# Patient Record
Sex: FEMALE | Race: BLACK OR AFRICAN AMERICAN | Employment: FULL TIME | URBAN - METROPOLITAN AREA
[De-identification: names, ages, dates, MRNs, and addresses within clinical notes are randomized per-mention and may not be internally consistent; named-entity substitution may affect disease eponyms.]

---

## 2021-06-08 ENCOUNTER — PATIENT MESSAGE (OUTPATIENT)
Dept: FAMILY MEDICINE CLINIC | Age: 36
End: 2021-06-08

## 2021-06-08 ENCOUNTER — OFFICE VISIT (OUTPATIENT)
Dept: FAMILY MEDICINE CLINIC | Age: 36
End: 2021-06-08
Payer: COMMERCIAL

## 2021-06-08 VITALS
BODY MASS INDEX: 31.65 KG/M2 | HEIGHT: 65 IN | DIASTOLIC BLOOD PRESSURE: 89 MMHG | OXYGEN SATURATION: 99 % | WEIGHT: 190 LBS | HEART RATE: 73 BPM | SYSTOLIC BLOOD PRESSURE: 120 MMHG | RESPIRATION RATE: 12 BRPM

## 2021-06-08 DIAGNOSIS — R22.1 MASS OF RIGHT SIDE OF NECK: Primary | ICD-10-CM

## 2021-06-08 PROCEDURE — 99203 OFFICE O/P NEW LOW 30 MIN: CPT | Performed by: FAMILY MEDICINE

## 2021-06-08 RX ORDER — NORGESTIMATE AND ETHINYL ESTRADIOL 0.25-0.035
KIT ORAL
COMMUNITY
Start: 2021-05-13 | End: 2022-08-02

## 2021-06-08 NOTE — ASSESSMENT & PLAN NOTE
Exam would not catch my attention outside of patient report of change from baseline. No dry mouth or parotid involvement to suggest Sjogren's. Not consistent with thyroglossal duct cyst. Further evaluation required to determine whether surgery or other intervention required. Currently benign process favored such delayed restoration to baseline size following lymphoid recruitment.

## 2021-06-08 NOTE — PROGRESS NOTES
Yadira Feliciano (: 1985) is a 39 y.o. female, new patient, here for:    ASSESSMENT/PLAN:  1. Mass of right side of neck  Assessment & Plan:  Exam would not catch my attention outside of patient report of change from baseline. No dry mouth or parotid involvement to suggest Sjogren's. Not consistent with thyroglossal duct cyst. Further evaluation required to determine whether surgery or other intervention required. Currently benign process favored such delayed restoration to baseline size following lymphoid recruitment. Orders:  -     US HEAD NECK SOFT TISSUE; Future      Return for neck ultrasound results via MyChart or by phone with plan to follow. SUBJECTIVE/OBJECTIVE:  HPI    Visible neck lesion x 6-8 months. Not painful. No associated symptoms. Stable since onset    No fever, unexplained weight loss  No dry mouth  No dental issues  No drainage or skin disruption    Fam hx largely unknown     been reminding her to follow through to have checked and finally complied      Physical Exam  Constitutional:       General: She is not in acute distress. Appearance: She is well-developed. She is not diaphoretic. HENT:      Head: Normocephalic and atraumatic. Nose: Nose normal.      Mouth/Throat:      Mouth: Mucous membranes are moist.      Dentition: Normal dentition. Tongue: No lesions. Pharynx: Oropharynx is clear. Eyes:      General: No scleral icterus. Conjunctiva/sclera: Conjunctivae normal.   Neck:     Cardiovascular:      Rate and Rhythm: Normal rate and regular rhythm. Heart sounds: Normal heart sounds. No murmur heard. No friction rub. No gallop. Pulmonary:      Effort: Pulmonary effort is normal. No respiratory distress. Breath sounds: Normal breath sounds. No wheezing, rhonchi or rales. Lymphadenopathy:      Head:      Right side of head: No tonsillar, posterior auricular or occipital adenopathy.       Left side of head: No tonsillar, posterior auricular or occipital adenopathy. Cervical:      Right cervical: No deep or posterior cervical adenopathy. Left cervical: No deep or posterior cervical adenopathy. Skin:     General: Skin is warm and dry. Nails: There is no clubbing. Neurological:      Mental Status: She is alert and oriented to person, place, and time. Psychiatric:         Behavior: Behavior normal.         Thought Content:  Thought content normal.         Judgment: Judgment normal.               -- Kita Gonsalves MD

## 2021-06-15 ENCOUNTER — HOSPITAL ENCOUNTER (OUTPATIENT)
Dept: ULTRASOUND IMAGING | Age: 36
Discharge: HOME | End: 2021-06-15
Attending: FAMILY MEDICINE
Payer: COMMERCIAL

## 2021-06-15 DIAGNOSIS — R22.1 MASS OF RIGHT SIDE OF NECK: ICD-10-CM

## 2021-06-15 PROCEDURE — 76536 US EXAM OF HEAD AND NECK: CPT

## 2021-06-16 NOTE — PROGRESS NOTES
Martel Sensing, please schedule a follow up visit with me to review the ultrasound results and our options. There is no need to panic! Your visit may be in the office, or it may be virtual, which ever you prefer.  Take good care, BOLIVAR

## 2021-06-22 ENCOUNTER — OFFICE VISIT (OUTPATIENT)
Dept: FAMILY MEDICINE CLINIC | Age: 36
End: 2021-06-22
Payer: COMMERCIAL

## 2021-06-22 VITALS
BODY MASS INDEX: 31.99 KG/M2 | OXYGEN SATURATION: 97 % | HEIGHT: 65 IN | WEIGHT: 192 LBS | RESPIRATION RATE: 12 BRPM | HEART RATE: 78 BPM | DIASTOLIC BLOOD PRESSURE: 73 MMHG | SYSTOLIC BLOOD PRESSURE: 122 MMHG

## 2021-06-22 DIAGNOSIS — K02.9 DENTAL CARIES: Primary | ICD-10-CM

## 2021-06-22 DIAGNOSIS — R59.0 SUBMENTAL LYMPHADENOPATHY: ICD-10-CM

## 2021-06-22 PROCEDURE — 99213 OFFICE O/P EST LOW 20 MIN: CPT | Performed by: FAMILY MEDICINE

## 2021-06-22 RX ORDER — AMOXICILLIN 500 MG/1
500 CAPSULE ORAL 3 TIMES DAILY
Qty: 30 CAPSULE | Refills: 0 | Status: SHIPPED | OUTPATIENT
Start: 2021-06-22 | End: 2021-07-02

## 2021-06-22 NOTE — PROGRESS NOTES
Pt in office for ultrasound review. 1. Have you been to the ER, urgent care clinic since your last visit? Hospitalized since your last visit? No    2. Have you seen or consulted any other health care providers outside of the [address] [place] [address] since your last visit? Include any pap smears or colon screening.  No

## 2021-06-22 NOTE — PROGRESS NOTES
Leora Sears (: 1985) is a 39 y.o. female, established patient, here for:    ASSESSMENT/PLAN:  1. Dental caries  -     amoxicillin (AMOXIL) 500 mg capsule; Take 1 Capsule by mouth three (3) times daily for 10 days. , Normal, Disp-30 Capsule, R-0  2. Submental lymphadenopathy  Assessment & Plan:  Low likelihood (8%) malignancy at this size reviewed. Confident due to dental caries. Trial amox. Improvement with antibiotics would be validating. If no change, highlight at next visit with dentist and return to me if told not consistent with cavity under consideration. Would then refer to ENT. Orders:  -     amoxicillin (AMOXIL) 500 mg capsule; Take 1 Capsule by mouth three (3) times daily for 10 days. , Normal, Disp-30 Capsule, R-0        No follow-ups on file. SUBJECTIVE/OBJECTIVE:  HPI    Dentist has been following a cavity x 18 months. Next surveillance in the fall. US Results (most recent):  Results from Orders Only encounter on 06/15/21    US HEAD NECK SOFT TISSUE    Narrative  Ultrasound head and neck soft tissue    HISTORY: Palpable areas of concern right neck; Cinda Rad Dx: Mass of right side of neck  [R22.1 (ICD-10-CM)]    COMPARISON: None. FINDINGS:    Targeted sonographic assessment performed of the right neck utilizing a linear 9  MHz transducer with representative static images obtained. The patient indicated the first area of palpable concern right submental region. In the area of palpable concern a 1.9 x 0.6 x 1.8 cm circumscribed oval  hypoechoic mass is noted without a fatty hilum and with some mild vascularity  likely an abnormal appearing lymph node. No abscess or skin thickening in the  region. The patient indicated a second area of palpable concern in the right  submandibular region and in the area a 1.1 x 0.4 x 1.1 cm circumscribed  hypoechoic mass is noted without a fatty hilum and with some vascular flow  likely a mildly abnormal lymph node as well.  No abscess or skin thickening in  the region area    Impression  In the 2 areas of clinical concern indicated by the patient, abnormal appearing  lymph nodes without fatty romeo are noted the larger in the submental region. These potentially could be reactive but clinical correlation is recommended. Further assessment of these lymph nodes should be based on clinical grounds. Physical Exam  Lymphadenopathy:      Upper Body:      Right upper body: No axillary adenopathy. Left upper body: No axillary adenopathy. Lower Body: No right inguinal adenopathy. No left inguinal adenopathy.                -- Juan Jose Ochoa MD

## 2021-06-22 NOTE — ASSESSMENT & PLAN NOTE
Low likelihood (8%) malignancy at this size reviewed. Confident due to dental caries. Trial amox. Improvement with antibiotics would be validating. If no change, highlight at next visit with dentist and return to me if told not consistent with cavity under consideration. Would then refer to ENT.

## 2021-10-18 ENCOUNTER — OFFICE VISIT (OUTPATIENT)
Dept: FAMILY MEDICINE CLINIC | Age: 36
End: 2021-10-18
Payer: COMMERCIAL

## 2021-10-18 VITALS
TEMPERATURE: 98.5 F | DIASTOLIC BLOOD PRESSURE: 66 MMHG | BODY MASS INDEX: 33.32 KG/M2 | RESPIRATION RATE: 10 BRPM | HEART RATE: 60 BPM | HEIGHT: 65 IN | OXYGEN SATURATION: 100 % | SYSTOLIC BLOOD PRESSURE: 114 MMHG | WEIGHT: 200 LBS

## 2021-10-18 DIAGNOSIS — L68.0 HIRSUTISM: ICD-10-CM

## 2021-10-18 DIAGNOSIS — K21.9 GASTROESOPHAGEAL REFLUX DISEASE, UNSPECIFIED WHETHER ESOPHAGITIS PRESENT: ICD-10-CM

## 2021-10-18 DIAGNOSIS — N92.1 MENOMETRORRHAGIA: ICD-10-CM

## 2021-10-18 DIAGNOSIS — R07.89 CHEST PAIN, NON-CARDIAC: Primary | ICD-10-CM

## 2021-10-18 DIAGNOSIS — R06.02 SHORTNESS OF BREATH: ICD-10-CM

## 2021-10-18 DIAGNOSIS — M25.50 POLYARTHRALGIA: ICD-10-CM

## 2021-10-18 DIAGNOSIS — D64.9 ANEMIA, UNSPECIFIED TYPE: ICD-10-CM

## 2021-10-18 DIAGNOSIS — R20.2 PARESTHESIA: ICD-10-CM

## 2021-10-18 DIAGNOSIS — E16.1 HYPERINSULINEMIA: ICD-10-CM

## 2021-10-18 PROCEDURE — 99215 OFFICE O/P EST HI 40 MIN: CPT | Performed by: FAMILY MEDICINE

## 2021-10-18 RX ORDER — PHENOL/SODIUM PHENOLATE
20 AEROSOL, SPRAY (ML) MUCOUS MEMBRANE DAILY
Qty: 30 TABLET | Refills: 2 | Status: SHIPPED | OUTPATIENT
Start: 2021-10-18 | End: 2021-11-16

## 2021-10-18 NOTE — PROGRESS NOTES
Patient being seen today to discuss multiple symptoms she says she has been having on and off for about 10 years now and no one can figure out what is causing them. She c/o neck and chest pain as well as hot flashes. She also c/o joint pains, arms going numb mostly the left arm she says she has tingling in her limbs and random dizzy spells. 1. \"Have you been to the ER, urgent care clinic since your last visit? Hospitalized since your last visit? \" No    2. \"Have you seen or consulted any other health care providers outside of the [address] [place] [address] since your last visit? \" No     3. For patients aged 39-70: Has the patient had a colonoscopy? No n/a  If the patient is female:    4. For patients aged 41-77: Has the patient had a mammogram within the past 2 years? No n/a    5. For patients aged 21-65: Has the patient had a pap smear? No Has [facility]
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
## 2021-10-21 ENCOUNTER — HOSPITAL ENCOUNTER (OUTPATIENT)
Dept: LAB | Age: 36
Discharge: HOME | End: 2021-10-21
Payer: COMMERCIAL

## 2021-10-21 DIAGNOSIS — D64.9 ANEMIA, UNSPECIFIED TYPE: ICD-10-CM

## 2021-10-21 DIAGNOSIS — R20.2 PARESTHESIA: ICD-10-CM

## 2021-10-21 DIAGNOSIS — E16.1 HYPERINSULINEMIA: ICD-10-CM

## 2021-10-21 DIAGNOSIS — M25.50 POLYARTHRALGIA: ICD-10-CM

## 2021-10-21 LAB
25(OH)D3 SERPL-MCNC: 30.8 NG/ML (ref 30–100)
ALBUMIN SERPL-MCNC: 3.5 G/DL (ref 3.4–5)
ALBUMIN/GLOB SERPL: 0.9 {RATIO} (ref 0.8–1.7)
ALP SERPL-CCNC: 36 U/L (ref 45–117)
ALT SERPL-CCNC: 17 U/L (ref 13–56)
ANION GAP SERPL CALC-SCNC: 3 MMOL/L (ref 3–18)
AST SERPL-CCNC: 14 U/L (ref 10–38)
BASOPHILS # BLD: 0 K/UL (ref 0–0.1)
BASOPHILS NFR BLD: 1 % (ref 0–2)
BILIRUB SERPL-MCNC: 0.3 MG/DL (ref 0.2–1)
BUN SERPL-MCNC: 12 MG/DL (ref 7–18)
BUN/CREAT SERPL: 14 (ref 12–20)
CALCIUM SERPL-MCNC: 8.9 MG/DL (ref 8.5–10.1)
CHLORIDE SERPL-SCNC: 107 MMOL/L (ref 100–111)
CO2 SERPL-SCNC: 28 MMOL/L (ref 21–32)
CREAT SERPL-MCNC: 0.86 MG/DL (ref 0.6–1.3)
DIFFERENTIAL METHOD BLD: ABNORMAL
EOSINOPHIL # BLD: 0.1 K/UL (ref 0–0.4)
EOSINOPHIL NFR BLD: 1 % (ref 0–5)
ERYTHROCYTE [DISTWIDTH] IN BLOOD BY AUTOMATED COUNT: 12.7 % (ref 11.6–14.5)
ERYTHROCYTE [SEDIMENTATION RATE] IN BLOOD: 29 MM/HR (ref 0–20)
EST. AVERAGE GLUCOSE BLD GHB EST-MCNC: 120 MG/DL
FERRITIN SERPL-MCNC: 36 NG/ML (ref 8–388)
FOLATE SERPL-MCNC: 13 NG/ML (ref 3.1–17.5)
GLOBULIN SER CALC-MCNC: 4.1 G/DL (ref 2–4)
GLUCOSE SERPL-MCNC: 96 MG/DL (ref 74–99)
HBA1C MFR BLD: 5.8 % (ref 4.2–5.6)
HCT VFR BLD AUTO: 35.2 % (ref 35–45)
HGB BLD-MCNC: 11.4 G/DL (ref 12–16)
IRON SATN MFR SERPL: 22 % (ref 20–50)
IRON SERPL-MCNC: 93 UG/DL (ref 50–175)
LYMPHOCYTES # BLD: 2 K/UL (ref 0.9–3.6)
LYMPHOCYTES NFR BLD: 31 % (ref 21–52)
MCH RBC QN AUTO: 28.6 PG (ref 24–34)
MCHC RBC AUTO-ENTMCNC: 32.4 G/DL (ref 31–37)
MCV RBC AUTO: 88.4 FL (ref 78–100)
MONOCYTES # BLD: 0.4 K/UL (ref 0.05–1.2)
MONOCYTES NFR BLD: 6 % (ref 3–10)
NEUTS SEG # BLD: 4 K/UL (ref 1.8–8)
NEUTS SEG NFR BLD: 61 % (ref 40–73)
PLATELET # BLD AUTO: 348 K/UL (ref 135–420)
PMV BLD AUTO: 9.3 FL (ref 9.2–11.8)
POTASSIUM SERPL-SCNC: 4.1 MMOL/L (ref 3.5–5.5)
PROT SERPL-MCNC: 7.6 G/DL (ref 6.4–8.2)
RBC # BLD AUTO: 3.98 M/UL (ref 4.2–5.3)
SODIUM SERPL-SCNC: 138 MMOL/L (ref 136–145)
TIBC SERPL-MCNC: 422 UG/DL (ref 250–450)
TSH SERPL-ACNC: 1.78 UIU/ML (ref 0.36–3.74)
VIT B12 SERPL-MCNC: 239 PG/ML (ref 211–911)
WBC # BLD AUTO: 6.5 K/UL (ref 4.6–13.2)

## 2021-10-21 PROCEDURE — 80053 COMPREHEN METABOLIC PANEL: CPT

## 2021-10-21 PROCEDURE — 82306 VITAMIN D 25 HYDROXY: CPT

## 2021-10-21 PROCEDURE — 83540 ASSAY OF IRON: CPT

## 2021-10-21 PROCEDURE — 83525 ASSAY OF INSULIN: CPT

## 2021-10-21 PROCEDURE — 82157 ASSAY OF ANDROSTENEDIONE: CPT

## 2021-10-21 PROCEDURE — 82607 VITAMIN B-12: CPT

## 2021-10-21 PROCEDURE — 85025 COMPLETE CBC W/AUTO DIFF WBC: CPT

## 2021-10-21 PROCEDURE — 83036 HEMOGLOBIN GLYCOSYLATED A1C: CPT

## 2021-10-21 PROCEDURE — 85652 RBC SED RATE AUTOMATED: CPT

## 2021-10-21 PROCEDURE — 83498 ASY HYDROXYPROGESTERONE 17-D: CPT

## 2021-10-21 PROCEDURE — 84146 ASSAY OF PROLACTIN: CPT

## 2021-10-21 PROCEDURE — 36415 COLL VENOUS BLD VENIPUNCTURE: CPT

## 2021-10-21 PROCEDURE — 86141 C-REACTIVE PROTEIN HS: CPT

## 2021-10-21 PROCEDURE — 82728 ASSAY OF FERRITIN: CPT

## 2021-10-21 PROCEDURE — 84443 ASSAY THYROID STIM HORMONE: CPT

## 2021-10-21 PROCEDURE — 82627 DEHYDROEPIANDROSTERONE: CPT

## 2021-10-22 LAB
CRP SERPL HS-MCNC: 7.1 MG/L
DHEA-S SERPL-MCNC: 190 UG/DL (ref 57.3–279.2)
INSULIN SERPL-ACNC: 16.8 UIU/ML (ref 2.6–24.9)
PROLACTIN SERPL-MCNC: 3.4 NG/ML

## 2021-10-27 LAB — ANDROST SERPL-MCNC: 69 NG/DL (ref 41–262)

## 2021-11-16 ENCOUNTER — OFFICE VISIT (OUTPATIENT)
Dept: FAMILY MEDICINE CLINIC | Age: 36
End: 2021-11-16
Payer: COMMERCIAL

## 2021-11-16 VITALS
SYSTOLIC BLOOD PRESSURE: 110 MMHG | OXYGEN SATURATION: 98 % | HEART RATE: 61 BPM | DIASTOLIC BLOOD PRESSURE: 78 MMHG | RESPIRATION RATE: 10 BRPM | HEIGHT: 65 IN | TEMPERATURE: 97.6 F | WEIGHT: 204.8 LBS | BODY MASS INDEX: 34.12 KG/M2

## 2021-11-16 DIAGNOSIS — R73.03 PREDIABETES: ICD-10-CM

## 2021-11-16 DIAGNOSIS — R07.89 CHEST PAIN, NON-CARDIAC: ICD-10-CM

## 2021-11-16 DIAGNOSIS — E28.2 PCOS (POLYCYSTIC OVARIAN SYNDROME): Primary | ICD-10-CM

## 2021-11-16 DIAGNOSIS — M25.50 POLYARTHRALGIA: ICD-10-CM

## 2021-11-16 DIAGNOSIS — R06.02 SHORTNESS OF BREATH: ICD-10-CM

## 2021-11-16 PROCEDURE — 99214 OFFICE O/P EST MOD 30 MIN: CPT | Performed by: FAMILY MEDICINE

## 2021-11-16 RX ORDER — METFORMIN HYDROCHLORIDE 500 MG/1
1000 TABLET, EXTENDED RELEASE ORAL
Qty: 180 TABLET | Refills: 1 | Status: SHIPPED | OUTPATIENT
Start: 2021-11-16 | End: 2022-03-29

## 2021-11-16 NOTE — ASSESSMENT & PLAN NOTE
menometrorrhagia, hirsutism, preDM with otherwise normal labs. Initiating metformin 1000 mg daily.  follow up 3-6 months

## 2021-11-16 NOTE — PROGRESS NOTES
Patient here for f/u on her lab results and to discuss some symptoms she has been having. 1. \"Have you been to the ER, urgent care clinic since your last visit? Hospitalized since your last visit? \" No    2. \"Have you seen or consulted any other health care providers outside of the [address] [place] [address] since your last visit? \" No     3. For patients aged 39-70: Has the patient had a colonoscopy? No n/a    If the patient is female:    4. For patients aged 41-77: Has the patient had a mammogram within the past 2 years? No n/a    5. For patients aged 21-65: Has the patient had a pap smear? Yes, but HM not satisfied.  Rooming MA/LPN to request most recent results

## 2021-11-16 NOTE — PROGRESS NOTES
Heike Ceballos (: 1985) is a 39 y.o. female, established patient, here for:    ASSESSMENT/PLAN:  1. PCOS (polycystic ovarian syndrome)  Assessment & Plan:  menometrorrhagia, hirsutism, preDM with otherwise normal labs. Initiating metformin 1000 mg daily. follow up 3-6 months   Orders:  -     metFORMIN ER (GLUCOPHAGE XR) 500 mg tablet; Take 2 Tablets by mouth daily (with dinner). Start with 1 tab daily. Increase as tolerated., Normal, Disp-180 Tablet, R-1  2. Shortness of breath  Assessment & Plan:  No clinical improvement with PPI. Discontinuing. PFTs. Declined empiric trial albuterol, which is reasonable. Orders:  -     PULMONARY FUNCTION TEST; Future  3. Chest pain, non-cardiac  -     PULMONARY FUNCTION TEST; Future  4. Polyarthralgia  Assessment & Plan:  Declining reassessment of clinical symptomatology after addressing PCOS and suspected mild asthma. I suspect her low-normal iron may be truly low for her and contributor to her symptomatology. She is not currently interested in iron replacement. ESR 29 is not concerning, but with CRP 7, rheum consultation is reasonable. Orders:  -     REFERRAL TO RHEUMATOLOGY  5. Prediabetes  Assessment & Plan:  New. Metformin for comorbid PCOS. Return in about 6 weeks (around 2021) for PFT follow up, (30).       SUBJECTIVE/OBJECTIVE:  HPI    follow up non cardiac chest pain, non exertional shortness of breath  - empiric trial PPI    follow up menometrorrhagia (controlled with COCs) and hirsutism, prior report of hyperinsulinemia suggestive of PCOS    Particularly concerned about lab implications of her polyarthralgias, which persist, with morning focus  Results for orders placed or performed during the hospital encounter of 10/21/21   INSULIN   Result Value Ref Range    Insulin 16.8 2.6 - 64.8 uIU/mL   METABOLIC PANEL, COMPREHENSIVE   Result Value Ref Range    Sodium 138 136 - 145 mmol/L    Potassium 4.1 3.5 - 5.5 mmol/L    Chloride 107 100 - 111 mmol/L CO2 28 21 - 32 mmol/L    Anion gap 3 3.0 - 18 mmol/L    Glucose 96 74 - 99 mg/dL    BUN 12 7.0 - 18 MG/DL    Creatinine 0.86 0.6 - 1.3 MG/DL    BUN/Creatinine ratio 14 12 - 20      GFR est AA >60 >60 ml/min/1.73m2    GFR est non-AA >60 >60 ml/min/1.73m2    Calcium 8.9 8.5 - 10.1 MG/DL    Bilirubin, total 0.3 0.2 - 1.0 MG/DL    ALT (SGPT) 17 13 - 56 U/L    AST (SGOT) 14 10 - 38 U/L    Alk. phosphatase 36 (L) 45 - 117 U/L    Protein, total 7.6 6.4 - 8.2 g/dL    Albumin 3.5 3.4 - 5.0 g/dL    Globulin 4.1 (H) 2.0 - 4.0 g/dL    A-G Ratio 0.9 0.8 - 1.7     ANDROSTENEDIONE   Result Value Ref Range    Androstenedione 69 41 - 262 ng/dL   PROLACTIN   Result Value Ref Range    Prolactin 3.4 ng/mL   DHEA SULFATE   Result Value Ref Range    DHEA Sulfate 190.0 57.3 - 279.2 ug/dL   TSH W/ REFLX FREE T4 IF ABNORMAL   Result Value Ref Range    TSH 1.78 0.36 - 3.74 uIU/mL   CRP, HIGH SENSITIVITY   Result Value Ref Range    CRP, High sensitivity 7.1 mg/L   SED RATE (ESR)   Result Value Ref Range    Sed rate, automated 29 (H) 0 - 20 mm/hr   CBC WITH AUTOMATED DIFF   Result Value Ref Range    WBC 6.5 4.6 - 13.2 K/uL    RBC 3.98 (L) 4.20 - 5.30 M/uL    HGB 11.4 (L) 12.0 - 16.0 g/dL    HCT 35.2 35.0 - 45.0 %    MCV 88.4 78.0 - 100.0 FL    MCH 28.6 24.0 - 34.0 PG    MCHC 32.4 31.0 - 37.0 g/dL    RDW 12.7 11.6 - 14.5 %    PLATELET 779 551 - 638 K/uL    MPV 9.3 9.2 - 11.8 FL    NEUTROPHILS 61 40 - 73 %    LYMPHOCYTES 31 21 - 52 %    MONOCYTES 6 3 - 10 %    EOSINOPHILS 1 0 - 5 %    BASOPHILS 1 0 - 2 %    ABS. NEUTROPHILS 4.0 1.8 - 8.0 K/UL    ABS. LYMPHOCYTES 2.0 0.9 - 3.6 K/UL    ABS. MONOCYTES 0.4 0.05 - 1.2 K/UL    ABS. EOSINOPHILS 0.1 0.0 - 0.4 K/UL    ABS.  BASOPHILS 0.0 0.0 - 0.1 K/UL    DF AUTOMATED     FERRITIN   Result Value Ref Range    Ferritin 36 8 - 388 NG/ML   IRON PROFILE   Result Value Ref Range    Iron 93 50 - 175 ug/dL    TIBC 422 250 - 450 ug/dL    Iron % saturation 22 20 - 50 %   VITAMIN D, 25 HYDROXY   Result Value Ref Range    Vitamin D 25-Hydroxy 30.8 30 - 100 ng/mL   VITAMIN B12 & FOLATE   Result Value Ref Range    Vitamin B12 239 211 - 911 pg/mL    Folate 13.0 3.10 - 17.50 ng/mL   HEMOGLOBIN A1C WITH EAG   Result Value Ref Range    Hemoglobin A1c 5.8 (H) 4.2 - 5.6 %    Est. average glucose 120 mg/dL     Prior to Admission medications    Medication Sig Start Date End Date Taking? Authorizing Provider   Omeprazole delayed release (PRILOSEC D/R) 20 mg tablet Take 1 Tablet by mouth daily.  10/18/21  Yes Mary Ann Leung MD   Mono-Linyah 0.25-35 mg-mcg tab take 1 tablet by mouth once daily 5/13/21  Yes Provider, Historical       Physical Exam          -- Isrrael Overton MD

## 2021-11-16 NOTE — ASSESSMENT & PLAN NOTE
No clinical improvement with PPI. Discontinuing. PFTs. Declined empiric trial albuterol, which is reasonable.

## 2021-11-16 NOTE — ASSESSMENT & PLAN NOTE
Declining reassessment of clinical symptomatology after addressing PCOS and suspected mild asthma. I suspect her low-normal iron may be truly low for her and contributor to her symptomatology. She is not currently interested in iron replacement. ESR 29 is not concerning, but with CRP 7, rheum consultation is reasonable.

## 2021-11-16 NOTE — PATIENT INSTRUCTIONS
Learning About Metformin for Type 2 Diabetes  Introduction     Metformin is a medicine used to treat type 2 diabetes. It helps keep blood sugar levels on target. You may have tried to eat a healthy diet, lose weight, and get more exercise to keep your blood sugar in your target range. If those things do not help, you may take a medicine called metformin. It helps your body use insulin. This can help you control your blood sugar. You might take it on its own or with other medicines. When taken on its own, metformin should not cause low blood sugar or weight gain. Example  · Metformin (Glucophage)  Possible side effects  Common side effects include:  · Short-term nausea. · Not feeling hungry. · Diarrhea. · Increased gas in your belly. · A metallic taste. You may have side effects or reactions not listed here. Check the information that comes with your medicine. What to know about taking this medicine  · Metformin does not usually cause low blood sugar. But you may get a low blood sugar when you take metformin and you exercise hard, drink alcohol, or you do not eat enough food. · Sometimes metformin is combined with other diabetes medicine. Some of these can cause low blood sugar. · If you need a test that uses a dye or you need to have surgery, be sure to tell all of your doctors that you take metformin. You may have to stop taking it before and after the test or surgery. · Over time, blood levels of vitamin B12 can decrease in some people who take metformin. Your body needs this B vitamin to make blood cells. It also keeps your nervous system healthy. If you have been taking metformin for more than a few years, ask your doctor if you need a B12 blood test to measure the amount of vitamin B12 in your blood. · Be safe with medicines. Take your medicines exactly as prescribed. Call your doctor if you think you are having a problem with your medicine.   · Check with your doctor or pharmacist before you use any other medicines. This includes over-the-counter medicines. Make sure your doctor knows all of the medicines, vitamins, herbal products, and supplements you take. Taking some medicines together can cause problems. Where can you learn more? Go to http://www.[employer].com/  Enter J360 in the search box to learn more about \"Learning About Metformin for Type 2 Diabetes. \"  Current as of: August 31, 2020               Content Version: 13.0  © 2006-2021 [employer]. Care instructions adapted under license by [employer] (which disclaims liability or warranty for this information). If you have questions about a medical condition or this instruction, always ask your healthcare professional. [address] any warranty or liability for your use of this information. Polycystic Ovary Syndrome: Care Instructions  Overview  Polycystic ovary syndrome (PCOS) is a hormone imbalance that can affect ovulation. It can cause problems with your periods and make it hard to get pregnant. Doctors don't know for sure what causes PCOS, but it seems to run in families. It also seems to be linked to obesity and a risk for diabetes. You may have other symptoms. These include weight gain, acne, hair growth on the face or body, high blood pressure, and high blood sugar. Your ovaries may have cysts on them. These cysts are growths filled with fluid. Keep in mind that even though you may not have regular periods, you can still get pregnant. Talk to your doctor about birth control if you don't want to get pregnant. Sometimes the hormone changes with PCOS can also make it hard to get pregnant. If this is a concern, talk to your doctor about treatment for this problem. With PCOS, you may go for months or longer with no period. Your doctor may recommend medicines that can help regulate your cycle. Follow-up care is a key part of your treatment and safety.  Be sure to make and go to all appointments, and call your doctor if you are having problems. It's also a good idea to know your test results and keep a list of the medicines you take. How can you care for yourself at home? · Take your medicines exactly as prescribed. Call your doctor if you think you're having a problem with your medicine. · Eat a healthy diet. Include vegetables, fruits, beans, and whole grains in your diet each day. · If you're overweight, talk to your doctor about safe ways to lose weight. Losing weight can help with many of the symptoms of PCOS. · Get at least 30 minutes of exercise on most days of the week. Walking is a good choice. Or you can run, swim, cycle, or play team sports. · If you have symptoms that bother you, such as acne and excess hair growth, talk to your doctor about treatment options. Medicines can help. For unwanted hair growth, some prefer to use home treatments. These can include shaving, waxing, or other methods to remove the hair. · If you're feeling sad or depressed, consider talking to a counselor or to others who have PCOS. It may help. When should you call for help? Call your doctor now or seek immediate medical care if:    · You have severe vaginal bleeding.     · You have new or worse belly or pelvic pain. Watch closely for changes in your health, and be sure to contact your doctor if:    · You do not get better as expected.     · You have unusual vaginal bleeding. Where can you learn more? Go to http://owen-ern[employer].info/  Enter K559 in the search box to learn more about \"Polycystic Ovary Syndrome: Care Instructions. \"  Current as of: February 11, 2021               Content Version: 13.0  © 2410-0790 [employer]. Care instructions adapted under license by [employer] (which disclaims liability or warranty for this information).  If you have questions about a medical condition or this instruction, always ask your healthcare professional. [address] any warranty or liability for your use of this information.

## 2021-11-17 DIAGNOSIS — R07.89 CHEST PAIN, NON-CARDIAC: ICD-10-CM

## 2021-11-17 DIAGNOSIS — R06.02 SHORTNESS OF BREATH: ICD-10-CM

## 2021-12-06 ENCOUNTER — HOSPITAL ENCOUNTER (OUTPATIENT)
Dept: PULMONOLOGY | Age: 36
Discharge: HOME | End: 2021-12-06
Payer: COMMERCIAL

## 2021-12-06 VITALS — HEART RATE: 62 BPM | OXYGEN SATURATION: 96 %

## 2021-12-06 PROCEDURE — 94640 AIRWAY INHALATION TREATMENT: CPT

## 2021-12-06 PROCEDURE — 94726 PLETHYSMOGRAPHY LUNG VOLUMES: CPT

## 2021-12-06 PROCEDURE — 94729 DIFFUSING CAPACITY: CPT

## 2021-12-06 PROCEDURE — 94060 EVALUATION OF WHEEZING: CPT

## 2021-12-06 RX ORDER — ALBUTEROL SULFATE 0.83 MG/ML
2.5 SOLUTION RESPIRATORY (INHALATION)
Status: COMPLETED | OUTPATIENT
Start: 2021-12-06 | End: 2021-12-06

## 2021-12-06 RX ADMIN — ALBUTEROL SULFATE 2.5 MG: 0.83 SOLUTION RESPIRATORY (INHALATION) at 12:00

## 2021-12-06 NOTE — PROGRESS NOTES
Patient did great. She tolerated the HHN and procedure well. No adverse reactions noted. The order had to be reentered because it was not activated for me to complete.

## 2021-12-07 ENCOUNTER — TELEPHONE (OUTPATIENT)
Dept: FAMILY MEDICINE CLINIC | Age: 36
End: 2021-12-07

## 2021-12-07 NOTE — TELEPHONE ENCOUNTER
Order has been printed and faxed, called Kieran Carey to let her know she should be receiving order within a few minutes.

## 2021-12-07 NOTE — TELEPHONE ENCOUNTER
----- Message from Vivi Jimenez sent at 12/6/2021 12:50 PM EST -----  Subject: Message to Provider    QUESTIONS  Information for Provider? David from [facility] calling,   no order for the PFT and she already processed it and wanted to see if we   could fax a order to her today.  #[phone] fax# [phone]  ---------------------------------------------------------------------------  --------------  Anna Carrier INFO  What is the best way for the office to contact you? OK to leave message on   voicemail  Preferred Call Back Phone Number? [phone]  ---------------------------------------------------------------------------  --------------  SCRIPT ANSWERS  Relationship to Patient?  Third Party  Representative Name? pft

## 2021-12-28 ENCOUNTER — OFFICE VISIT (OUTPATIENT)
Dept: FAMILY MEDICINE CLINIC | Age: 36
End: 2021-12-28
Payer: COMMERCIAL

## 2021-12-28 VITALS
HEART RATE: 60 BPM | TEMPERATURE: 98.2 F | SYSTOLIC BLOOD PRESSURE: 128 MMHG | WEIGHT: 207 LBS | OXYGEN SATURATION: 99 % | BODY MASS INDEX: 34.49 KG/M2 | HEIGHT: 65 IN | RESPIRATION RATE: 12 BRPM | DIASTOLIC BLOOD PRESSURE: 78 MMHG

## 2021-12-28 DIAGNOSIS — R06.02 SHORTNESS OF BREATH: Primary | ICD-10-CM

## 2021-12-28 DIAGNOSIS — E28.2 PCOS (POLYCYSTIC OVARIAN SYNDROME): ICD-10-CM

## 2021-12-28 PROCEDURE — 99213 OFFICE O/P EST LOW 20 MIN: CPT | Performed by: FAMILY MEDICINE

## 2021-12-28 NOTE — PROGRESS NOTES
Patient being seen today for f/u on her PFT results. 1. \"Have you been to the ER, urgent care clinic since your last visit? Hospitalized since your last visit? \" No    2. \"Have you seen or consulted any other health care providers outside of the [address] [place] [address] since your last visit? \" No     3. For patients aged 39-70: Has the patient had a colonoscopy / FIT/ Cologuard? NA based on age or sex     If the patient is female:    3. For patients aged 41-77: Has the patient had a mammogram within the past 2 years? NA based on age or sex    11. For patients aged 21-65: Has the patient had a pap smear?  No

## 2021-12-28 NOTE — ASSESSMENT & PLAN NOTE
Episodic nature occurring at rest argues against cardiomyopathy or CAD. normal PFTs eliminates chronic lung disease. Could still be bronchospastic, but short duration makes less likely. Previously normal Holter study may not have captured problem. For now we agreed to focus on other conditions being treated and symptom diary. Will follow up 3-6 months, sooner if new/different symptoms.

## 2021-12-28 NOTE — PROGRESS NOTES
Sarai Lucas (: 1985) is a 39 y.o. female, established patient, here for:    ASSESSMENT/PLAN:  1. Shortness of breath  Assessment & Plan:  Episodic nature occurring at rest argues against cardiomyopathy or CAD. normal PFTs eliminates chronic lung disease. Could still be bronchospastic, but short duration makes less likely. Previously normal Holter study may not have captured problem. For now we agreed to focus on other conditions being treated and symptom diary. Will follow up 3-6 months, sooner if new/different symptoms. 2. PCOS (polycystic ovarian syndrome)  Assessment & Plan: Tolerating metformin. follow up 3-6 months to assess response       Return for PCOS follow up in 3-6 months, no labs prior, (30). SUBJECTIVE/OBJECTIVE:  HPI    follow up PFTs for shortness of breath - normal     shortness of breath tends to happen in \"spurts\" then can go weeks with no problems. Each time lasts 20 seconds to perhaps 3 minutes. Always happens when not physically active  Reports normal Holter monitor x 48 days - event happened after she turned it in    No particular time of day  Hasn't looked for seasonal or other patterns    follow up PCOS - initial nausea with metformin. Tolerating 1000 mg daily without side effects x 2 weeks      Prior to Admission medications    Medication Sig Start Date End Date Taking? Authorizing Provider   metFORMIN ER (GLUCOPHAGE XR) 500 mg tablet Take 2 Tablets by mouth daily (with dinner). Start with 1 tab daily. Increase as tolerated. 21  Yes Olivia Gagnon MD   Mono-Linyah 0.25-35 mg-mcg tab take 1 tablet by mouth once daily 21  Yes Provider, Historical       Physical Exam  Constitutional:       General: She is not in acute distress. Appearance: She is well-developed. HENT:      Head: Normocephalic and atraumatic. Pulmonary:      Effort: Pulmonary effort is normal.   Neurological:      Mental Status: She is alert and oriented to person, place, and time. Psychiatric:         Behavior: Behavior normal.         Thought Content:  Thought content normal.         Judgment: Judgment normal.               -- Calderon Lind MD

## 2022-03-04 ENCOUNTER — TELEPHONE (OUTPATIENT)
Dept: FAMILY MEDICINE CLINIC | Age: 37
End: 2022-03-04

## 2022-03-04 NOTE — TELEPHONE ENCOUNTER
Received fax from Dr. Tom Signs office stating patients Vit B12 is low and to f/u with her PCP. Patient has been scheduled on 3/29/22. Will request lab results.

## 2022-03-18 PROBLEM — R06.02 SHORTNESS OF BREATH: Status: ACTIVE | Noted: 2021-11-16

## 2022-03-18 PROBLEM — M25.50 POLYARTHRALGIA: Status: ACTIVE | Noted: 2021-11-16

## 2022-03-18 PROBLEM — E28.2 PCOS (POLYCYSTIC OVARIAN SYNDROME): Status: ACTIVE | Noted: 2021-11-16

## 2022-03-19 PROBLEM — R59.0 SUBMENTAL LYMPHADENOPATHY: Status: ACTIVE | Noted: 2021-06-08

## 2022-03-19 PROBLEM — R73.03 PREDIABETES: Status: ACTIVE | Noted: 2021-11-16

## 2022-03-29 ENCOUNTER — OFFICE VISIT (OUTPATIENT)
Dept: FAMILY MEDICINE CLINIC | Age: 37
End: 2022-03-29
Payer: COMMERCIAL

## 2022-03-29 VITALS
SYSTOLIC BLOOD PRESSURE: 110 MMHG | RESPIRATION RATE: 10 BRPM | BODY MASS INDEX: 33.66 KG/M2 | HEIGHT: 65 IN | OXYGEN SATURATION: 99 % | DIASTOLIC BLOOD PRESSURE: 68 MMHG | WEIGHT: 202 LBS | HEART RATE: 63 BPM | TEMPERATURE: 99.5 F

## 2022-03-29 DIAGNOSIS — E53.8 VITAMIN B12 DEFICIENCY: Primary | ICD-10-CM

## 2022-03-29 DIAGNOSIS — E28.2 PCOS (POLYCYSTIC OVARIAN SYNDROME): ICD-10-CM

## 2022-03-29 PROCEDURE — 99214 OFFICE O/P EST MOD 30 MIN: CPT | Performed by: FAMILY MEDICINE

## 2022-03-29 RX ORDER — LANOLIN ALCOHOL/MO/W.PET/CERES
1000 CREAM (GRAM) TOPICAL DAILY
Qty: 90 TABLET | Refills: 3 | Status: SHIPPED | OUTPATIENT
Start: 2022-03-29

## 2022-03-29 RX ORDER — METFORMIN HYDROCHLORIDE 500 MG/1
1500 TABLET, EXTENDED RELEASE ORAL
Qty: 270 TABLET | Refills: 3 | Status: SHIPPED | OUTPATIENT
Start: 2022-03-29

## 2022-03-29 NOTE — PATIENT INSTRUCTIONS
Vitamin B12: About This Test  What is it? This blood test measures the amount of vitamin B12 in your blood. Your body needs this B vitamin to make blood cells and to keep your nervous system healthy. Why is this test done? This test is used to:  · Check for vitamin B12 deficiency anemia. The test is often done for those who've had stomach or bowel surgery. It's also done for those who have problems of the small intestine. And people may have the test if they have a family history of this anemia. · Check on the B12 level for someone who is on a diet that restricts certain foods (such as vegan). · Find the cause of other types of anemia. This includes megaloblastic anemia. Folate is usually measured at the same time. Lack of either vitamin can lead to this type of anemia. · Help find the cause of a decrease in mental abilities. It also can help find the cause of nervous system symptoms. These can include tingling or numbness of the arms or legs. · See if a person has vitamin B12 deficiency anemia after being diagnosed with atrophic gastritis. How do you prepare for the test?  You may need to fast for 8 hours before the test. Your doctor may give you some specific instructions. How is the test done? A health professional uses a needle to take a blood sample, usually from the arm. How long does the test take? The test will take a few minutes. What happens after the test?  · You will probably be able to go home right away. · You can go back to your usual activities right away. Follow-up care is a key part of your treatment and safety. Be sure to make and go to all appointments, and call your doctor if you are having problems. It's also a good idea to keep a list of the medicines you take. Ask your doctor when you can expect to have your test results. Where can you learn more?   Go to http://www.gray.com/  Enter Q213 in the search box to learn more about \"Vitamin B12: About This Test.\"  Current as of: November 29, 2021               Content Version: 13.2  © 3272-9199 [employer]. Care instructions adapted under license by [employer] (which disclaims liability or warranty for this information). If you have questions about a medical condition or this instruction, always ask your healthcare professional. [place] [address] any warranty or liability for your use of this information.

## 2022-07-27 ENCOUNTER — CLINICAL SUPPORT (OUTPATIENT)
Dept: FAMILY MEDICINE CLINIC | Age: 37
End: 2022-07-27
Payer: COMMERCIAL

## 2022-07-27 ENCOUNTER — HOSPITAL ENCOUNTER (OUTPATIENT)
Dept: LAB | Age: 37
Discharge: HOME | End: 2022-07-27
Payer: COMMERCIAL

## 2022-07-27 DIAGNOSIS — E28.2 PCOS (POLYCYSTIC OVARIAN SYNDROME): ICD-10-CM

## 2022-07-27 DIAGNOSIS — E53.8 VITAMIN B12 DEFICIENCY: Primary | ICD-10-CM

## 2022-07-27 DIAGNOSIS — E53.8 VITAMIN B12 DEFICIENCY: ICD-10-CM

## 2022-07-27 LAB
ALBUMIN SERPL-MCNC: 3.8 G/DL (ref 3.4–5)
ALBUMIN/GLOB SERPL: 1 {RATIO} (ref 0.8–1.7)
ALP SERPL-CCNC: 47 U/L (ref 45–117)
ALT SERPL-CCNC: 22 U/L (ref 13–56)
ANION GAP SERPL CALC-SCNC: 7 MMOL/L (ref 3–18)
AST SERPL-CCNC: 18 U/L (ref 10–38)
BASOPHILS # BLD: 0.1 K/UL (ref 0–0.1)
BASOPHILS NFR BLD: 1 % (ref 0–2)
BILIRUB SERPL-MCNC: 0.5 MG/DL (ref 0.2–1)
BUN SERPL-MCNC: 13 MG/DL (ref 7–18)
BUN/CREAT SERPL: 13 (ref 12–20)
CALCIUM SERPL-MCNC: 9.5 MG/DL (ref 8.5–10.1)
CHLORIDE SERPL-SCNC: 107 MMOL/L (ref 100–111)
CO2 SERPL-SCNC: 26 MMOL/L (ref 21–32)
CREAT SERPL-MCNC: 0.97 MG/DL (ref 0.6–1.3)
DIFFERENTIAL METHOD BLD: ABNORMAL
EOSINOPHIL # BLD: 0.2 K/UL (ref 0–0.4)
EOSINOPHIL NFR BLD: 2 % (ref 0–5)
ERYTHROCYTE [DISTWIDTH] IN BLOOD BY AUTOMATED COUNT: 14.1 % (ref 11.6–14.5)
EST. AVERAGE GLUCOSE BLD GHB EST-MCNC: 114 MG/DL
FOLATE SERPL-MCNC: 17.6 NG/ML (ref 3.1–17.5)
GLOBULIN SER CALC-MCNC: 3.9 G/DL (ref 2–4)
GLUCOSE SERPL-MCNC: 93 MG/DL (ref 74–99)
HBA1C MFR BLD: 5.6 % (ref 4.2–5.6)
HCT VFR BLD AUTO: 40.4 % (ref 35–45)
HGB BLD-MCNC: 12.1 G/DL (ref 12–16)
IMM GRANULOCYTES # BLD AUTO: 0 K/UL (ref 0–0.04)
IMM GRANULOCYTES NFR BLD AUTO: 0 % (ref 0–0.5)
LYMPHOCYTES # BLD: 2.7 K/UL (ref 0.9–3.6)
LYMPHOCYTES NFR BLD: 32 % (ref 21–52)
MCH RBC QN AUTO: 27.9 PG (ref 24–34)
MCHC RBC AUTO-ENTMCNC: 30 G/DL (ref 31–37)
MCV RBC AUTO: 93.1 FL (ref 78–100)
MONOCYTES # BLD: 0.6 K/UL (ref 0.05–1.2)
MONOCYTES NFR BLD: 7 % (ref 3–10)
NEUTS SEG # BLD: 4.9 K/UL (ref 1.8–8)
NEUTS SEG NFR BLD: 59 % (ref 40–73)
NRBC # BLD: 0 K/UL (ref 0–0.01)
NRBC BLD-RTO: 0 PER 100 WBC
PLATELET # BLD AUTO: 319 K/UL (ref 135–420)
PMV BLD AUTO: 9.4 FL (ref 9.2–11.8)
POTASSIUM SERPL-SCNC: 5.4 MMOL/L (ref 3.5–5.5)
PROT SERPL-MCNC: 7.7 G/DL (ref 6.4–8.2)
RBC # BLD AUTO: 4.34 M/UL (ref 4.2–5.3)
SODIUM SERPL-SCNC: 140 MMOL/L (ref 136–145)
VIT B12 SERPL-MCNC: 600 PG/ML (ref 211–911)
WBC # BLD AUTO: 8.3 K/UL (ref 4.6–13.2)

## 2022-07-27 PROCEDURE — 82607 VITAMIN B-12: CPT

## 2022-07-27 PROCEDURE — 36415 COLL VENOUS BLD VENIPUNCTURE: CPT | Performed by: FAMILY MEDICINE

## 2022-07-27 PROCEDURE — 85025 COMPLETE CBC W/AUTO DIFF WBC: CPT

## 2022-07-27 PROCEDURE — 83036 HEMOGLOBIN GLYCOSYLATED A1C: CPT

## 2022-07-27 PROCEDURE — 80053 COMPREHEN METABOLIC PANEL: CPT

## 2022-08-02 ENCOUNTER — OFFICE VISIT (OUTPATIENT)
Dept: FAMILY MEDICINE CLINIC | Age: 37
End: 2022-08-02
Payer: COMMERCIAL

## 2022-08-02 VITALS
HEART RATE: 68 BPM | HEIGHT: 65 IN | SYSTOLIC BLOOD PRESSURE: 110 MMHG | TEMPERATURE: 97.4 F | WEIGHT: 198 LBS | BODY MASS INDEX: 32.99 KG/M2 | OXYGEN SATURATION: 99 % | DIASTOLIC BLOOD PRESSURE: 76 MMHG | RESPIRATION RATE: 14 BRPM

## 2022-08-02 DIAGNOSIS — E28.2 PCOS (POLYCYSTIC OVARIAN SYNDROME): Primary | ICD-10-CM

## 2022-08-02 DIAGNOSIS — E66.9 CLASS 1 OBESITY WITHOUT SERIOUS COMORBIDITY IN ADULT, UNSPECIFIED BMI, UNSPECIFIED OBESITY TYPE: ICD-10-CM

## 2022-08-02 DIAGNOSIS — R73.03 PREDIABETES: ICD-10-CM

## 2022-08-02 DIAGNOSIS — E53.8 VITAMIN B12 DEFICIENCY: ICD-10-CM

## 2022-08-02 PROBLEM — E66.811 CLASS 1 OBESITY IN ADULT: Status: ACTIVE | Noted: 2022-08-02

## 2022-08-02 PROCEDURE — 99214 OFFICE O/P EST MOD 30 MIN: CPT | Performed by: FAMILY MEDICINE

## 2022-08-02 NOTE — PROGRESS NOTES
Patient off EFM per Dr. Case to ambulate for one hour.  S/S reviewed of when to return sooner if need.    Sharon ALVARADO   Perry Wong (: 1985) is a 40 y.o. female, established patient, here for:    ASSESSMENT/PLAN:  1. PCOS (polycystic ovarian syndrome)  Assessment & Plan: With preDM. Doing well. Stopped COCs in anticipation of family building. Continue present management with metformin  Orders:  -     METABOLIC PANEL, COMPREHENSIVE; Future  2. Class 1 obesity without serious comorbidity in adult, unspecified BMI, unspecified obesity type  Assessment & Plan: With preDM, PCOS. Uncontrolled. Continue metformin. The Obesity Code   3. Prediabetes  Assessment & Plan:  A1c improved with metformin. Continue   Orders:  -     HEMOGLOBIN A1C WITH EAG; Future  4. Vitamin B12 deficiency  Assessment & Plan:  Well controlled, continue present management    Orders:  -     VITAMIN B12 & FOLATE; Future  -     CBC WITH AUTOMATED DIFF; Future    Doing well, regardless of whether it's from B12 replacement, cessation of COCs or reduced stressors    Return in about 7 months (around 3/2/2023) for chronic medical conditions, labs 1 week prior. Unless care has been assumed by OB    SUBJECTIVE/OBJECTIVE:    follow up low normal B12 on replacement    follow up Recurrent chest pains remain most bothersome concern  Also with fatigue, numbness and recent attack of dizziness    Feeling better  Also changed to less stressful job  Stopped COCs with family building plan    Still occasionally struggles with \"spurts\" of not being able to catch up on sleep  Dizziness spells resolved, finds she tends to lose balance in the week before menses    PCOS - on metformin since 2022.  No clear benefit to date    Results for orders placed or performed during the hospital encounter of    METABOLIC PANEL, COMPREHENSIVE   Result Value Ref Range    Sodium 140 136 - 145 mmol/L    Potassium 5.4 3.5 - 5.5 mmol/L    Chloride 107 100 - 111 mmol/L    CO2 26 21 - 32 mmol/L    Anion gap 7 3.0 - 18 mmol/L    Glucose 93 74 - 99 mg/dL    BUN 13 7.0 - 18 MG/DL    Creatinine 0. 97 0.6 - 1.3 MG/DL    BUN/Creatinine ratio 13 12 - 20      GFR est AA >60 >60 ml/min/1.73m2    GFR est non-AA >60 >60 ml/min/1.73m2    Calcium 9.5 8.5 - 10.1 MG/DL    Bilirubin, total 0.5 0.2 - 1.0 MG/DL    ALT (SGPT) 22 13 - 56 U/L    AST (SGOT) 18 10 - 38 U/L    Alk. phosphatase 47 45 - 117 U/L    Protein, total 7.7 6.4 - 8.2 g/dL    Albumin 3.8 3.4 - 5.0 g/dL    Globulin 3.9 2.0 - 4.0 g/dL    A-G Ratio 1.0 0.8 - 1.7     HEMOGLOBIN A1C WITH EAG   Result Value Ref Range    Hemoglobin A1c 5.6 4.2 - 5.6 %    Est. average glucose 114 mg/dL   CBC WITH AUTOMATED DIFF   Result Value Ref Range    WBC 8.3 4.6 - 13.2 K/uL    RBC 4.34 4.20 - 5.30 M/uL    HGB 12.1 12.0 - 16.0 g/dL    HCT 40.4 35.0 - 45.0 %    MCV 93.1 78.0 - 100.0 FL    MCH 27.9 24.0 - 34.0 PG    MCHC 30.0 (L) 31.0 - 37.0 g/dL    RDW 14.1 11.6 - 14.5 %    PLATELET 808 152 - 759 K/uL    MPV 9.4 9.2 - 11.8 FL    NRBC 0.0 0  WBC    ABSOLUTE NRBC 0.00 0.00 - 0.01 K/uL    NEUTROPHILS 59 40 - 73 %    LYMPHOCYTES 32 21 - 52 %    MONOCYTES 7 3 - 10 %    EOSINOPHILS 2 0 - 5 %    BASOPHILS 1 0 - 2 %    IMMATURE GRANULOCYTES 0 0.0 - 0.5 %    ABS. NEUTROPHILS 4.9 1.8 - 8.0 K/UL    ABS. LYMPHOCYTES 2.7 0.9 - 3.6 K/UL    ABS. MONOCYTES 0.6 0.05 - 1.2 K/UL    ABS. EOSINOPHILS 0.2 0.0 - 0.4 K/UL    ABS. BASOPHILS 0.1 0.0 - 0.1 K/UL    ABS. IMM. GRANS. 0.0 0.00 - 0.04 K/UL    DF AUTOMATED     VITAMIN B12 & FOLATE   Result Value Ref Range    Vitamin B12 600 211 - 911 pg/mL    Folate 17.6 (H) 3.10 - 17.50 ng/mL          Physical Exam  Constitutional:       General: She is not in acute distress. Appearance: She is well-developed. HENT:      Head: Normocephalic and atraumatic. Pulmonary:      Effort: Pulmonary effort is normal.   Neurological:      Mental Status: She is alert and oriented to person, place, and time. Psychiatric:         Behavior: Behavior normal.         Thought Content:  Thought content normal.         Judgment: Judgment normal.             -- Destinee Morales MD

## 2022-08-02 NOTE — PROGRESS NOTES
Chief Complaint   Patient presents with    PCOS    Vitamin B12 Deficiency         Patient here today for f/u on her B 15 def and her PCOS no concerns. 1. \"Have you been to the ER, urgent care clinic since your last visit? Hospitalized since your last visit? \" No    2. \"Have you seen or consulted any other health care providers outside of the [address] [place] [address] since your last visit? \" No     3. For patients aged 39-70: Has the patient had a colonoscopy / FIT/ Cologuard? NA - based on age      If the patient is female:    4. For patients aged 41-77: Has the patient had a mammogram within the past 2 years? NA - based on age or sex      11. For patients aged 21-65: Has the patient had a pap smear?  Yes - no Care Gap present

## 2022-08-02 NOTE — ASSESSMENT & PLAN NOTE
With preDM. Doing well. Stopped COCs in anticipation of family building.  Continue present management with metformin

## 2023-02-03 DIAGNOSIS — E28.2 PCOS (POLYCYSTIC OVARIAN SYNDROME): Primary | ICD-10-CM

## 2023-02-03 DIAGNOSIS — E53.8 VITAMIN B12 DEFICIENCY: Primary | ICD-10-CM

## 2023-02-05 DIAGNOSIS — E53.8 VITAMIN B12 DEFICIENCY: Primary | ICD-10-CM

## 2023-02-05 DIAGNOSIS — R73.03 PREDIABETES: Primary | ICD-10-CM

## 2023-03-29 ENCOUNTER — NURSE ONLY (OUTPATIENT)
Facility: CLINIC | Age: 38
End: 2023-03-29
Payer: COMMERCIAL

## 2023-03-29 ENCOUNTER — HOSPITAL ENCOUNTER (OUTPATIENT)
Facility: HOSPITAL | Age: 38
Setting detail: SPECIMEN
Discharge: HOME | End: 2023-04-01
Payer: COMMERCIAL

## 2023-03-29 DIAGNOSIS — R73.03 PREDIABETES: ICD-10-CM

## 2023-03-29 DIAGNOSIS — D64.9 ANEMIA, UNSPECIFIED TYPE: ICD-10-CM

## 2023-03-29 DIAGNOSIS — E53.8 VITAMIN B12 DEFICIENCY: ICD-10-CM

## 2023-03-29 DIAGNOSIS — E28.2 PCOS (POLYCYSTIC OVARIAN SYNDROME): ICD-10-CM

## 2023-03-29 DIAGNOSIS — E28.2 POLYCYSTIC OVARIAN SYNDROME: Primary | ICD-10-CM

## 2023-03-29 LAB
ALBUMIN SERPL-MCNC: 2.9 G/DL (ref 3.4–5)
ALBUMIN/GLOB SERPL: 0.8 (ref 0.8–1.7)
ALP SERPL-CCNC: 81 U/L (ref 45–117)
ALT SERPL-CCNC: 13 U/L (ref 13–56)
ANION GAP SERPL CALC-SCNC: 7 MMOL/L (ref 3–18)
AST SERPL-CCNC: 15 U/L (ref 10–38)
BASOPHILS # BLD: 0 K/UL (ref 0–0.1)
BASOPHILS NFR BLD: 0 % (ref 0–2)
BILIRUB SERPL-MCNC: 0.4 MG/DL (ref 0.2–1)
BUN SERPL-MCNC: 5 MG/DL (ref 7–18)
BUN/CREAT SERPL: 9 (ref 12–20)
CALCIUM SERPL-MCNC: 9.6 MG/DL (ref 8.5–10.1)
CHLORIDE SERPL-SCNC: 106 MMOL/L (ref 100–111)
CO2 SERPL-SCNC: 24 MMOL/L (ref 21–32)
CREAT SERPL-MCNC: 0.57 MG/DL (ref 0.6–1.3)
DIFFERENTIAL METHOD BLD: ABNORMAL
EOSINOPHIL # BLD: 0.1 K/UL (ref 0–0.4)
EOSINOPHIL NFR BLD: 1 % (ref 0–5)
ERYTHROCYTE [DISTWIDTH] IN BLOOD BY AUTOMATED COUNT: 13.2 % (ref 11.6–14.5)
EST. AVERAGE GLUCOSE BLD GHB EST-MCNC: 103 MG/DL
FOLATE SERPL-MCNC: >20 NG/ML (ref 3.1–17.5)
GLOBULIN SER CALC-MCNC: 3.8 G/DL (ref 2–4)
GLUCOSE SERPL-MCNC: 69 MG/DL (ref 74–99)
HBA1C MFR BLD: 5.2 % (ref 4.2–5.6)
HCT VFR BLD AUTO: 31.9 % (ref 35–45)
HGB BLD-MCNC: 10.2 G/DL (ref 12–16)
IMM GRANULOCYTES # BLD AUTO: 0.1 K/UL (ref 0–0.04)
IMM GRANULOCYTES NFR BLD AUTO: 1 % (ref 0–0.5)
LYMPHOCYTES # BLD: 2 K/UL (ref 0.9–3.6)
LYMPHOCYTES NFR BLD: 18 % (ref 21–52)
MCH RBC QN AUTO: 29.5 PG (ref 24–34)
MCHC RBC AUTO-ENTMCNC: 32 G/DL (ref 31–37)
MCV RBC AUTO: 92.2 FL (ref 78–100)
MONOCYTES # BLD: 0.7 K/UL (ref 0.05–1.2)
MONOCYTES NFR BLD: 6 % (ref 3–10)
NEUTS SEG # BLD: 8.5 K/UL (ref 1.8–8)
NEUTS SEG NFR BLD: 75 % (ref 40–73)
NRBC # BLD: 0 K/UL (ref 0–0.01)
NRBC BLD-RTO: 0 PER 100 WBC
PLATELET # BLD AUTO: 281 K/UL (ref 135–420)
PMV BLD AUTO: 9.7 FL (ref 9.2–11.8)
POTASSIUM SERPL-SCNC: 5.2 MMOL/L (ref 3.5–5.5)
PROT SERPL-MCNC: 6.7 G/DL (ref 6.4–8.2)
RBC # BLD AUTO: 3.46 M/UL (ref 4.2–5.3)
SODIUM SERPL-SCNC: 137 MMOL/L (ref 136–145)
VIT B12 SERPL-MCNC: 481 PG/ML (ref 211–911)
WBC # BLD AUTO: 11.4 K/UL (ref 4.6–13.2)

## 2023-03-29 PROCEDURE — 80053 COMPREHEN METABOLIC PANEL: CPT

## 2023-03-29 PROCEDURE — 85025 COMPLETE CBC W/AUTO DIFF WBC: CPT

## 2023-03-29 PROCEDURE — 36415 COLL VENOUS BLD VENIPUNCTURE: CPT

## 2023-03-29 PROCEDURE — 36415 COLL VENOUS BLD VENIPUNCTURE: CPT | Performed by: FAMILY MEDICINE

## 2023-03-29 PROCEDURE — 83036 HEMOGLOBIN GLYCOSYLATED A1C: CPT

## 2023-03-29 PROCEDURE — 82607 VITAMIN B-12: CPT

## 2023-04-07 ENCOUNTER — OFFICE VISIT (OUTPATIENT)
Facility: CLINIC | Age: 38
End: 2023-04-07
Payer: COMMERCIAL

## 2023-04-07 VITALS
OXYGEN SATURATION: 99 % | BODY MASS INDEX: 35.99 KG/M2 | HEART RATE: 67 BPM | SYSTOLIC BLOOD PRESSURE: 118 MMHG | WEIGHT: 216 LBS | TEMPERATURE: 98.1 F | DIASTOLIC BLOOD PRESSURE: 88 MMHG | HEIGHT: 65 IN

## 2023-04-07 DIAGNOSIS — R73.03 PREDIABETES: ICD-10-CM

## 2023-04-07 DIAGNOSIS — E28.2 PCOS (POLYCYSTIC OVARIAN SYNDROME): ICD-10-CM

## 2023-04-07 DIAGNOSIS — E53.8 VITAMIN B12 DEFICIENCY: Primary | ICD-10-CM

## 2023-04-07 DIAGNOSIS — Z3A.31 31 WEEKS GESTATION OF PREGNANCY: ICD-10-CM

## 2023-04-07 PROBLEM — R06.02 SHORTNESS OF BREATH: Status: RESOLVED | Noted: 2021-11-16 | Resolved: 2023-04-07

## 2023-04-07 PROBLEM — M25.50 POLYARTHRALGIA: Status: RESOLVED | Noted: 2021-11-16 | Resolved: 2023-04-07

## 2023-04-07 PROBLEM — R59.0 SUBMENTAL LYMPHADENOPATHY: Status: RESOLVED | Noted: 2021-06-08 | Resolved: 2023-04-07

## 2023-04-07 PROCEDURE — 99214 OFFICE O/P EST MOD 30 MIN: CPT | Performed by: FAMILY MEDICINE

## 2023-04-07 RX ORDER — FERROUS SULFATE 325(65) MG
TABLET ORAL
COMMUNITY
Start: 2020-06-30

## 2023-04-07 RX ORDER — ASPIRIN 81 MG/1
81 TABLET, CHEWABLE ORAL DAILY
COMMUNITY

## 2023-04-07 SDOH — ECONOMIC STABILITY: FOOD INSECURITY: WITHIN THE PAST 12 MONTHS, THE FOOD YOU BOUGHT JUST DIDN'T LAST AND YOU DIDN'T HAVE MONEY TO GET MORE.: NEVER TRUE

## 2023-04-07 SDOH — ECONOMIC STABILITY: FOOD INSECURITY: WITHIN THE PAST 12 MONTHS, YOU WORRIED THAT YOUR FOOD WOULD RUN OUT BEFORE YOU GOT MONEY TO BUY MORE.: NEVER TRUE

## 2023-04-07 SDOH — ECONOMIC STABILITY: HOUSING INSECURITY
IN THE LAST 12 MONTHS, WAS THERE A TIME WHEN YOU DID NOT HAVE A STEADY PLACE TO SLEEP OR SLEPT IN A SHELTER (INCLUDING NOW)?: NO

## 2023-04-07 SDOH — ECONOMIC STABILITY: INCOME INSECURITY: HOW HARD IS IT FOR YOU TO PAY FOR THE VERY BASICS LIKE FOOD, HOUSING, MEDICAL CARE, AND HEATING?: NOT VERY HARD

## 2023-04-07 ASSESSMENT — PATIENT HEALTH QUESTIONNAIRE - PHQ9
SUM OF ALL RESPONSES TO PHQ QUESTIONS 1-9: 1
7. TROUBLE CONCENTRATING ON THINGS, SUCH AS READING THE NEWSPAPER OR WATCHING TELEVISION: 0
4. FEELING TIRED OR HAVING LITTLE ENERGY: 1
9. THOUGHTS THAT YOU WOULD BE BETTER OFF DEAD, OR OF HURTING YOURSELF: 0
2. FEELING DOWN, DEPRESSED OR HOPELESS: 0
1. LITTLE INTEREST OR PLEASURE IN DOING THINGS: 0
6. FEELING BAD ABOUT YOURSELF - OR THAT YOU ARE A FAILURE OR HAVE LET YOURSELF OR YOUR FAMILY DOWN: 0
10. IF YOU CHECKED OFF ANY PROBLEMS, HOW DIFFICULT HAVE THESE PROBLEMS MADE IT FOR YOU TO DO YOUR WORK, TAKE CARE OF THINGS AT HOME, OR GET ALONG WITH OTHER PEOPLE: 0
3. TROUBLE FALLING OR STAYING ASLEEP: 0
SUM OF ALL RESPONSES TO PHQ QUESTIONS 1-9: 1
8. MOVING OR SPEAKING SO SLOWLY THAT OTHER PEOPLE COULD HAVE NOTICED. OR THE OPPOSITE, BEING SO FIGETY OR RESTLESS THAT YOU HAVE BEEN MOVING AROUND A LOT MORE THAN USUAL: 0
SUM OF ALL RESPONSES TO PHQ9 QUESTIONS 1 & 2: 0
SUM OF ALL RESPONSES TO PHQ QUESTIONS 1-9: 1
SUM OF ALL RESPONSES TO PHQ QUESTIONS 1-9: 1
5. POOR APPETITE OR OVEREATING: 0

## 2023-04-07 NOTE — ASSESSMENT & PLAN NOTE
Context PCOS and now nearly 32 completed weeks pregnancy. Excellent glucose control off medications. Well done.  Continue present management

## 2023-04-07 NOTE — PROGRESS NOTES
Chief Complaint   Patient presents with    Other     PCOS  Pre Dm  Vitamin B12 def     Patient here today to be seen for her 7 month chronic condition follow up and lab review. 1. \"Have you been to the ER, urgent care clinic since your last visit? Hospitalized since your last visit? \" No    2. \"Have you seen or consulted any other health care providers outside of the [address] [place] [address] since your last visit? \"  Has seen her Ob      3. For patients aged 39-70: Has the patient had a colonoscopy / FIT/ Cologuard? NA - based on age      If the patient is female:    4. For patients aged 41-77: Has the patient had a mammogram within the past 2 years? NA - based on age or sex      11. For patients aged 21-65: Has the patient had a pap smear?  Yes - no Care Gap present
31.0 - 37.0 g/dL Final    RDW 03/29/2023 13.2  11.6 - 14.5 % Final    Platelets 47/65/0641 281  135 - 420 K/uL Final    MPV 03/29/2023 9.7  9.2 - 11.8 FL Final    Nucleated RBCs 03/29/2023 0.0  0  WBC Final    nRBC 03/29/2023 0.00  0.00 - 0.01 K/uL Final    Seg Neutrophils 03/29/2023 75 (H)  40 - 73 % Final    Lymphocytes 03/29/2023 18 (L)  21 - 52 % Final    Monocytes 03/29/2023 6  3 - 10 % Final    Eosinophils % 03/29/2023 1  0 - 5 % Final    Basophils 03/29/2023 0  0 - 2 % Final    Immature Granulocytes 03/29/2023 1 (H)  0.0 - 0.5 % Final    Segs Absolute 03/29/2023 8.5 (H)  1.8 - 8.0 K/UL Final    Absolute Lymph # 03/29/2023 2.0  0.9 - 3.6 K/UL Final    Absolute Mono # 03/29/2023 0.7  0.05 - 1.2 K/UL Final    Absolute Eos # 03/29/2023 0.1  0.0 - 0.4 K/UL Final    Basophils Absolute 03/29/2023 0.0  0.0 - 0.1 K/UL Final    Absolute Immature Granulocyte 03/29/2023 0.1 (H)  0.00 - 0.04 K/UL Final    Differential Type 03/29/2023 AUTOMATED    Final    Vitamin B-12 03/29/2023 481  211 - 911 pg/mL Final    Folate 03/29/2023 >20.0 (H)  3.10 - 17.50 ng/mL Final         Physical Exam  Constitutional:       General: She is not in acute distress. Appearance: Normal appearance. HENT:      Head: Normocephalic and atraumatic. Pulmonary:      Effort: Pulmonary effort is normal.   Neurological:      Mental Status: She is alert and oriented to person, place, and time.              -- Shelly An MD

## 2023-10-18 ENCOUNTER — OFFICE VISIT (OUTPATIENT)
Facility: CLINIC | Age: 38
End: 2023-10-18
Payer: COMMERCIAL

## 2023-10-18 ENCOUNTER — HOSPITAL ENCOUNTER (OUTPATIENT)
Facility: HOSPITAL | Age: 38
Discharge: HOME | End: 2023-10-20
Attending: STUDENT IN AN ORGANIZED HEALTH CARE EDUCATION/TRAINING PROGRAM
Payer: COMMERCIAL

## 2023-10-18 VITALS
RESPIRATION RATE: 16 BRPM | OXYGEN SATURATION: 98 % | WEIGHT: 202.25 LBS | DIASTOLIC BLOOD PRESSURE: 72 MMHG | HEART RATE: 53 BPM | SYSTOLIC BLOOD PRESSURE: 98 MMHG | BODY MASS INDEX: 33.66 KG/M2 | TEMPERATURE: 96.5 F

## 2023-10-18 DIAGNOSIS — M79.89 RIGHT LEG SWELLING: Primary | ICD-10-CM

## 2023-10-18 DIAGNOSIS — M25.561 ACUTE PAIN OF RIGHT KNEE: ICD-10-CM

## 2023-10-18 DIAGNOSIS — M79.89 RIGHT LEG SWELLING: ICD-10-CM

## 2023-10-18 PROCEDURE — 93971 EXTREMITY STUDY: CPT

## 2023-10-18 PROCEDURE — 99213 OFFICE O/P EST LOW 20 MIN: CPT | Performed by: STUDENT IN AN ORGANIZED HEALTH CARE EDUCATION/TRAINING PROGRAM

## 2023-10-18 RX ORDER — ACETAMINOPHEN 160 MG
2000 TABLET,DISINTEGRATING ORAL DAILY
COMMUNITY

## 2023-10-18 NOTE — PROGRESS NOTES
Chief Complaint   Patient presents with    Joint Swelling     Pt states she has been having the right knee swelling for about 3-4 weeks now. Pt have some concerns about losing her hairline. 1. \"Have you been to the ER, urgent care clinic since your last visit? Hospitalized since your last visit? \" No    2. \"Have you seen or consulted any other health care providers outside of the [address] [place] [address] since your last visit? \" No     3. For patients aged 43-73: Has the patient had a colonoscopy / FIT/ Cologuard? NA - based on age      If the patient is female:    4. For patients aged 43-66: Has the patient had a mammogram within the past 2 years? NA - based on age or sex      11. For patients aged 21-65: Has the patient had a pap smear?  Yes - no Care Gap present
Musculoskeletal - Some diffuse swelling to R thigh and lower leg. No pitting edema. Negative deepak's sign. R knee effusion without erythema or skin changes. Negative posterior/anterior drawer test.  Normal valgus and valgus endpoints. Some medial joint line tenderness to palpation. Negative Marcela's. Skin - normal coloration and normal turgor. Medical History- Reviewed Social History- Reviewed Surgical History- Reviewed   Serafin Dumont has a past medical history of Anemia. Serafin Dumont reports that she has never smoked. She has never used smokeless tobacco. She reports current alcohol use of about 4.0 standard drinks of alcohol per week. She reports that she does not currently use drugs. Serafin Dumont has a past surgical history that includes Tubal ligation (8/18/23). Problem List- Reviewed   Serafin Dumont has PCOS (polycystic ovarian syndrome); Prediabetes; Vitamin B12 deficiency; and Class 1 obesity in adult on their problem list.       Current Outpatient Medications   Medication Instructions    cyanocobalamin 1,000 mcg, Oral, DAILY    ferrous sulfate (IRON 325) 325 (65 Fe) MG tablet Oral    Vitamin D3 2,000 Units, Oral, DAILY           Note to patient: The purpose of this note is to communicate with the other providers involved in your care. It is written using standard medical terminology. The voice recognition software Dragon was used and quite often unanticipated grammatical, syntax, homophones, and other interpretive errors are inadvertently transcribed. Please disregard these and excuse any that have escaped final proofreading. If you have questions regarding details of the note please call my office at [phone] and make an appointment to discuss your concerns. An electronic signature was used to authenticate this note.   -- Alex Donahue MD

## 2023-10-23 ENCOUNTER — TELEPHONE (OUTPATIENT)
Facility: CLINIC | Age: 38
End: 2023-10-23

## 2023-10-23 NOTE — TELEPHONE ENCOUNTER
----- Message from Shlomo Martinez MD sent at 10/19/2023  9:19 AM EDT -----  Can you call pt and make sure she sees my message?  I have printed the handout

## 2023-10-23 NOTE — TELEPHONE ENCOUNTER
Called and spoke to Ms. Guajardo concerning her results and informed her that a hand out would be left at the  for her to . Pt stated she understood and would stop by to  the handout.

## 2024-04-03 ENCOUNTER — NURSE ONLY (OUTPATIENT)
Facility: CLINIC | Age: 39
End: 2024-04-03
Payer: COMMERCIAL

## 2024-04-03 ENCOUNTER — HOSPITAL ENCOUNTER (OUTPATIENT)
Facility: HOSPITAL | Age: 39
Setting detail: SPECIMEN
Discharge: HOME | End: 2024-04-06
Payer: COMMERCIAL

## 2024-04-03 DIAGNOSIS — R73.03 PREDIABETES: ICD-10-CM

## 2024-04-03 DIAGNOSIS — E53.8 VITAMIN B12 DEFICIENCY: Primary | ICD-10-CM

## 2024-04-03 DIAGNOSIS — E28.2 PCOS (POLYCYSTIC OVARIAN SYNDROME): ICD-10-CM

## 2024-04-03 DIAGNOSIS — E53.8 VITAMIN B12 DEFICIENCY: ICD-10-CM

## 2024-04-03 LAB
ALBUMIN SERPL-MCNC: 3.9 G/DL (ref 3.4–5)
ALBUMIN/GLOB SERPL: 1.1 (ref 0.8–1.7)
ALP SERPL-CCNC: 45 U/L (ref 45–117)
ALT SERPL-CCNC: 17 U/L (ref 13–56)
ANION GAP SERPL CALC-SCNC: 2 MMOL/L (ref 3–18)
AST SERPL-CCNC: 16 U/L (ref 10–38)
BASOPHILS # BLD: 0 K/UL (ref 0–0.1)
BASOPHILS NFR BLD: 1 % (ref 0–2)
BILIRUB SERPL-MCNC: 0.5 MG/DL (ref 0.2–1)
BUN SERPL-MCNC: 11 MG/DL (ref 7–18)
BUN/CREAT SERPL: 13 (ref 12–20)
CALCIUM SERPL-MCNC: 9.2 MG/DL (ref 8.5–10.1)
CHLORIDE SERPL-SCNC: 107 MMOL/L (ref 100–111)
CO2 SERPL-SCNC: 28 MMOL/L (ref 21–32)
CREAT SERPL-MCNC: 0.82 MG/DL (ref 0.6–1.3)
DIFFERENTIAL METHOD BLD: ABNORMAL
EOSINOPHIL # BLD: 0.1 K/UL (ref 0–0.4)
EOSINOPHIL NFR BLD: 2 % (ref 0–5)
ERYTHROCYTE [DISTWIDTH] IN BLOOD BY AUTOMATED COUNT: 13.5 % (ref 11.6–14.5)
EST. AVERAGE GLUCOSE BLD GHB EST-MCNC: 120 MG/DL
GLOBULIN SER CALC-MCNC: 3.6 G/DL (ref 2–4)
GLUCOSE SERPL-MCNC: 88 MG/DL (ref 74–99)
HBA1C MFR BLD: 5.8 % (ref 4.2–5.6)
HCT VFR BLD AUTO: 38.3 % (ref 35–45)
HGB BLD-MCNC: 12 G/DL (ref 12–16)
IMM GRANULOCYTES # BLD AUTO: 0 K/UL (ref 0–0.04)
IMM GRANULOCYTES NFR BLD AUTO: 0 % (ref 0–0.5)
LYMPHOCYTES # BLD: 2.3 K/UL (ref 0.9–3.6)
LYMPHOCYTES NFR BLD: 36 % (ref 21–52)
MCH RBC QN AUTO: 28.8 PG (ref 24–34)
MCHC RBC AUTO-ENTMCNC: 31.3 G/DL (ref 31–37)
MCV RBC AUTO: 91.8 FL (ref 78–100)
MONOCYTES # BLD: 0.5 K/UL (ref 0.05–1.2)
MONOCYTES NFR BLD: 7 % (ref 3–10)
NEUTS SEG # BLD: 3.5 K/UL (ref 1.8–8)
NEUTS SEG NFR BLD: 55 % (ref 40–73)
NRBC # BLD: 0 K/UL (ref 0–0.01)
NRBC BLD-RTO: 0 PER 100 WBC
PLATELET # BLD AUTO: 318 K/UL (ref 135–420)
PMV BLD AUTO: 9.6 FL (ref 9.2–11.8)
POTASSIUM SERPL-SCNC: 4.1 MMOL/L (ref 3.5–5.5)
PROT SERPL-MCNC: 7.5 G/DL (ref 6.4–8.2)
RBC # BLD AUTO: 4.17 M/UL (ref 4.2–5.3)
SODIUM SERPL-SCNC: 137 MMOL/L (ref 136–145)
VIT B12 SERPL-MCNC: 451 PG/ML (ref 211–911)
WBC # BLD AUTO: 6.5 K/UL (ref 4.6–13.2)

## 2024-04-03 PROCEDURE — 83036 HEMOGLOBIN GLYCOSYLATED A1C: CPT

## 2024-04-03 PROCEDURE — 36415 COLL VENOUS BLD VENIPUNCTURE: CPT

## 2024-04-03 PROCEDURE — 85025 COMPLETE CBC W/AUTO DIFF WBC: CPT

## 2024-04-03 PROCEDURE — 36415 COLL VENOUS BLD VENIPUNCTURE: CPT | Performed by: FAMILY MEDICINE

## 2024-04-03 PROCEDURE — 82607 VITAMIN B-12: CPT

## 2024-04-03 PROCEDURE — 80053 COMPREHEN METABOLIC PANEL: CPT

## 2024-04-09 ENCOUNTER — OFFICE VISIT (OUTPATIENT)
Facility: CLINIC | Age: 39
End: 2024-04-09
Payer: COMMERCIAL

## 2024-04-09 VITALS
HEART RATE: 66 BPM | OXYGEN SATURATION: 99 % | TEMPERATURE: 97.8 F | WEIGHT: 208.4 LBS | DIASTOLIC BLOOD PRESSURE: 68 MMHG | BODY MASS INDEX: 34.72 KG/M2 | SYSTOLIC BLOOD PRESSURE: 122 MMHG | HEIGHT: 65 IN

## 2024-04-09 DIAGNOSIS — D50.0 IRON DEFICIENCY ANEMIA DUE TO CHRONIC BLOOD LOSS: Primary | ICD-10-CM

## 2024-04-09 DIAGNOSIS — E53.8 VITAMIN B12 DEFICIENCY: ICD-10-CM

## 2024-04-09 DIAGNOSIS — N92.0 MENORRHAGIA WITH REGULAR CYCLE: ICD-10-CM

## 2024-04-09 DIAGNOSIS — E66.9 CLASS 1 OBESITY WITHOUT SERIOUS COMORBIDITY IN ADULT, UNSPECIFIED BMI, UNSPECIFIED OBESITY TYPE: ICD-10-CM

## 2024-04-09 DIAGNOSIS — G89.29 CHRONIC RIGHT SHOULDER PAIN: ICD-10-CM

## 2024-04-09 DIAGNOSIS — Z11.59 NEED FOR HEPATITIS C SCREENING TEST: ICD-10-CM

## 2024-04-09 DIAGNOSIS — R73.03 PREDIABETES: ICD-10-CM

## 2024-04-09 DIAGNOSIS — E28.2 PCOS (POLYCYSTIC OVARIAN SYNDROME): ICD-10-CM

## 2024-04-09 DIAGNOSIS — M25.511 CHRONIC RIGHT SHOULDER PAIN: ICD-10-CM

## 2024-04-09 PROCEDURE — 99214 OFFICE O/P EST MOD 30 MIN: CPT | Performed by: FAMILY MEDICINE

## 2024-04-09 SDOH — ECONOMIC STABILITY: HOUSING INSECURITY
IN THE LAST 12 MONTHS, WAS THERE A TIME WHEN YOU DID NOT HAVE A STEADY PLACE TO SLEEP OR SLEPT IN A SHELTER (INCLUDING NOW)?: PATIENT DECLINED

## 2024-04-09 SDOH — ECONOMIC STABILITY: INCOME INSECURITY: HOW HARD IS IT FOR YOU TO PAY FOR THE VERY BASICS LIKE FOOD, HOUSING, MEDICAL CARE, AND HEATING?: PATIENT DECLINED

## 2024-04-09 SDOH — ECONOMIC STABILITY: FOOD INSECURITY: WITHIN THE PAST 12 MONTHS, YOU WORRIED THAT YOUR FOOD WOULD RUN OUT BEFORE YOU GOT MONEY TO BUY MORE.: PATIENT DECLINED

## 2024-04-09 SDOH — ECONOMIC STABILITY: FOOD INSECURITY: WITHIN THE PAST 12 MONTHS, THE FOOD YOU BOUGHT JUST DIDN'T LAST AND YOU DIDN'T HAVE MONEY TO GET MORE.: PATIENT DECLINED

## 2024-04-09 ASSESSMENT — PATIENT HEALTH QUESTIONNAIRE - PHQ9
SUM OF ALL RESPONSES TO PHQ QUESTIONS 1-9: 0
1. LITTLE INTEREST OR PLEASURE IN DOING THINGS: NOT AT ALL
SUM OF ALL RESPONSES TO PHQ QUESTIONS 1-9: 0
SUM OF ALL RESPONSES TO PHQ QUESTIONS 1-9: 0
SUM OF ALL RESPONSES TO PHQ9 QUESTIONS 1 & 2: 0
2. FEELING DOWN, DEPRESSED OR HOPELESS: NOT AT ALL
SUM OF ALL RESPONSES TO PHQ QUESTIONS 1-9: 0

## 2024-04-09 NOTE — PROGRESS NOTES
Chief Complaint   Patient presents with    Pre-DM    PCOS    Vitamin B12 def    Shoulder Pain     Right shoulder pain for about 13 years on and off that has been more constant x 3-4 months.      \"Have you been to the ER, urgent care clinic since your last visit?  Hospitalized since your last visit?\"    NO    “Have you seen or consulted any other health care providers outside of [facility] since your last visit?”    NO            Click Here for Release of Records Request    
      SUBJECTIVE/OBJECTIVE:  Chief Complaint   Patient presents with    Pre-DM    PCOS    Vitamin B12 def    Shoulder Pain     Right shoulder pain for about 13 years on and off that has been more constant x 3-4 months.    Delivered healthy girl since last visit    follow up PCOS with preDM/obesity - no current meds. Interested in baseline assessment of health off meds/COCs     follow up low normal B12 on replacement    Long standing history of iron deficiency stemming from teen years \"I can always tell\" will take iron sporadically based on feeling cold, eye color fading to lighter shade of brown. Been attentive to adding more spinach and kale. Currently feeling well.  No formal workup. Menses very heavy. \"Tubes removed\" in August (contraception). Has appt with her OB in a few months.     Right shoulder pain shoulder injury 13 years ago - worse x months with crepitus, triggered by daily activity         Hospital Outpatient Visit on 04/03/2024   Component Date Value Ref Range Status    WBC 04/03/2024 6.5  4.6 - 13.2 K/uL Final    RBC 04/03/2024 4.17 (L)  4.20 - 5.30 M/uL Final    Hemoglobin 04/03/2024 12.0  12.0 - 16.0 g/dL Final    Hematocrit 04/03/2024 38.3  35.0 - 45.0 % Final    MCV 04/03/2024 91.8  78.0 - 100.0 FL Final    MCH 04/03/2024 28.8  24.0 - 34.0 PG Final    MCHC 04/03/2024 31.3  31.0 - 37.0 g/dL Final    RDW 04/03/2024 13.5  11.6 - 14.5 % Final    Platelets 04/03/2024 318  135 - 420 K/uL Final    MPV 04/03/2024 9.6  9.2 - 11.8 FL Final    Nucleated RBCs 04/03/2024 0.0  0  WBC Final    nRBC 04/03/2024 0.00  0.00 - 0.01 K/uL Final    Neutrophils % 04/03/2024 55  40 - 73 % Final    Lymphocytes % 04/03/2024 36  21 - 52 % Final    Monocytes % 04/03/2024 7  3 - 10 % Final    Eosinophils % 04/03/2024 2  0 - 5 % Final    Basophils % 04/03/2024 1  0 - 2 % Final    Immature Granulocytes % 04/03/2024 0  0.0 - 0.5 % Final    Neutrophils Absolute 04/03/2024 3.5  1.8 - 8.0 K/UL Final    Lymphocytes Absolute

## 2024-04-09 NOTE — ASSESSMENT & PLAN NOTE
CBC indices near normal. Currently asymptomatic. Presumably 2/2 menorrhagia. Not interested in iron studies or replacement today. Encouraged to schedule dedicated visit with her ob/gyn for menorrhagia. Will check iron indices 6months

## 2024-04-09 NOTE — ASSESSMENT & PLAN NOTE
menometrorrhagia, hirsutism, preDM: Had not had enough experience with metformin prior to pregnancy to determine clinical impact. Currently no meds. Discussed r/b resumption. Not currently interested pending assessment of health status off meds/COCs. Follow up 1 year, sooner as desired.

## 2024-04-17 SDOH — HEALTH STABILITY: PHYSICAL HEALTH: ON AVERAGE, HOW MANY DAYS PER WEEK DO YOU ENGAGE IN MODERATE TO STRENUOUS EXERCISE (LIKE A BRISK WALK)?: 1 DAY

## 2024-04-17 SDOH — HEALTH STABILITY: PHYSICAL HEALTH: ON AVERAGE, HOW MANY MINUTES DO YOU ENGAGE IN EXERCISE AT THIS LEVEL?: 20 MIN

## 2024-04-18 ENCOUNTER — OFFICE VISIT (OUTPATIENT)
Age: 39
End: 2024-04-18
Payer: COMMERCIAL

## 2024-04-18 VITALS — WEIGHT: 207 LBS | BODY MASS INDEX: 34.49 KG/M2 | HEIGHT: 65 IN

## 2024-04-18 DIAGNOSIS — M75.01 ADHESIVE CAPSULITIS OF RIGHT SHOULDER: Primary | ICD-10-CM

## 2024-04-18 DIAGNOSIS — M25.511 ACUTE PAIN OF RIGHT SHOULDER: ICD-10-CM

## 2024-04-18 PROCEDURE — 99203 OFFICE O/P NEW LOW 30 MIN: CPT | Performed by: ORTHOPAEDIC SURGERY

## 2024-04-18 PROCEDURE — 73030 X-RAY EXAM OF SHOULDER: CPT | Performed by: ORTHOPAEDIC SURGERY

## 2024-04-18 RX ORDER — MELOXICAM 15 MG/1
15 TABLET ORAL DAILY
Qty: 30 TABLET | Refills: 3 | Status: SHIPPED | OUTPATIENT
Start: 2024-04-18

## 2024-04-18 NOTE — PROGRESS NOTES
Shania Guajardo  1985   Chief Complaint   Patient presents with    Shoulder Pain     rt        HISTORY OF PRESENT ILLNESS  Shania Guajardo is a 39 y.o. female who presents today for evaluation of right shoulder pain.  Pain is a 3/10. Pain has been present for 3 months. She reports decreased range of motion with increased pain when reaching posteriorly, laterally, anteriorly, and overhead. She feels her \"shoulder is out of place\", a pooping sensation. Patient notes her pain is exacerbated at night time.  Amount of discomfort limits her activities on a day-to-day basis    Has tried following treatments: Injections:No; Brace:No; Therapy:No; Cane/Crutch:No      No Known Allergies     Past Medical History:   Diagnosis Date    Anemia       Social History       Tobacco History       Smoking Status  Never      Smokeless Tobacco Use  Never              Alcohol History       Alcohol Use Status  Yes Drinks/Week  4 Glasses of wine, 0 Cans of beer, 0 Shots of liquor, 0 Drinks containing 0.5 oz of alcohol per week Amount  4.0 standard drinks of alcohol/wk              Drug Use       Drug Use Status  Not Currently              Sexual Activity       Sexually Active  Yes Partners  Male                   Past Surgical History:   Procedure Laterality Date    TUBAL LIGATION  8/18/23      No family history on file.  Current Outpatient Medications   Medication Sig    Cholecalciferol (VITAMIN D3) 50 MCG (2000 UT) CAPS Take 1 capsule by mouth daily    ferrous sulfate (IRON 325) 325 (65 Fe) MG tablet Take by mouth (Patient not taking: Reported on 4/9/2024)    cyanocobalamin 1000 MCG tablet Take 1 tablet by mouth daily     No current facility-administered medications for this visit.       REVIEW OF SYSTEM   Patient denies: Weight loss, Fever/Chills, HA, Visual changes, Fatigue, Chest pain, SOB, Abdominal pain, N/V/D/C, Blood in stool or urine, Edema.   Pertinent positive as above in HPI. All others were

## 2024-04-29 ENCOUNTER — HOSPITAL ENCOUNTER (OUTPATIENT)
Facility: HOSPITAL | Age: 39
Setting detail: RECURRING SERIES
Discharge: HOME | End: 2024-05-02
Payer: COMMERCIAL

## 2024-04-29 PROCEDURE — 97110 THERAPEUTIC EXERCISES: CPT

## 2024-04-29 PROCEDURE — 97535 SELF CARE MNGMENT TRAINING: CPT

## 2024-04-29 PROCEDURE — 97162 PT EVAL MOD COMPLEX 30 MIN: CPT

## 2024-04-29 NOTE — PROGRESS NOTES
PT DAILY TREATMENT NOTE/SHOULDER EVAL     Patient Name: Shania Guajardo    Date: 2024    : 1985  Insurance: Payor: ANKIT / Plan: [facility] / Product Type: *No Product type* /      Patient  verified yes     Visit #   Current / Total 1 16   Time   In / Out 9:31 10:18   Pain   In / Out 3 2   Subjective Functional Status/Changes: See below        Treatment Area: Right shoulder pain [M25.511]    SUBJECTIVE  Pain Level (0-10 scale): pain at worst: 3/10   []constant []intermittent []improving []worsening []no change since onset    Subjective functional status/changes:     PLOF: Patient is right hand dominant. Patient reports being able to work without as much pain prior to 2023.   Limitations to PLOF: Patient exhibits decreased right shoulder AROM/PROM, decreased right shoulder strength, and postural dysfunction. Patient had a positive Valenzuela-Tiburcio, Neer's, Speed's, and Empty can test on the right. Patient responded well to right shoulder GHJ mobs with improved PROM and less pain. Patient demonstrates potential to make functional gains within a reasonable time frame. Patient would benefit from skilled PT to address above deficits and assist with return to PLOF.   Mechanism of Injury: Patient presents with right shoulder pain that originally began 12 years ago when patient was pulling a griddle off the top of a refrigerator and felt a pop in her right shoulder and stated shoulder hasn't felt the same ever since. Patient reports pain increased and became more consistent approximately 2023 with an insidious onset.   Current symptoms/Complaints: Patient describes right shoulder pain as constant dull aching and occasional sharp pain with activity. Patient has increased difficulty with reaching overhead, laying on left side, typing, and reaching behind back. Patient reports occasional numbness/tingling down right UE if pain is elevated and she continues to type. Patient also 
[id number]  Phone: [phone]      Fax:  [phone]

## 2024-05-06 ENCOUNTER — HOSPITAL ENCOUNTER (OUTPATIENT)
Facility: HOSPITAL | Age: 39
Setting detail: RECURRING SERIES
Discharge: HOME | End: 2024-05-09
Payer: COMMERCIAL

## 2024-05-06 PROCEDURE — 97110 THERAPEUTIC EXERCISES: CPT

## 2024-05-06 PROCEDURE — 97140 MANUAL THERAPY 1/> REGIONS: CPT

## 2024-05-06 PROCEDURE — 97112 NEUROMUSCULAR REEDUCATION: CPT

## 2024-05-06 NOTE — PROGRESS NOTES
PHYSICAL / OCCUPATIONAL THERAPY - DAILY TREATMENT NOTE     Patient Name: Shania Guajardo    Date: 2024    : 1985  Insurance: Payor: ANKIT / [facility]: [facility] / Product Type: *No Product type* /      Patient  verified Yes     Visit #   Current / Total 2 16   Time   In / Out 848 [id number]   Pain   In / Out 0 0   Subjective Functional Status/Changes: Good. I have been doing the exercises every day   Changes to:  Allergies, Med Hx, Sx Hx?   no       TREATMENT AREA =  Right shoulder pain [M25.511]    OBJECTIVE    Therapeutic Procedures:  Tx Min Billable or 1:1 Min (if diff from Tx Min) Procedure, Rationale, Specifics   8 8 37801 Manual Therapy (timed):  decrease pain, increase ROM, and increase tissue extensibility to improve patient's ability to progress to PLOF and address remaining functional goals.  The manual therapy interventions were performed at a separate and distinct time from the therapeutic activities interventions . Details: rib pumping with breaths with verbal consent for hand plamcent, manual right pec stretch     Details if applicable:       25 20 39749 Therapeutic Exercise (timed):  increase ROM, strength, coordination, balance, and proprioception to improve patient's ability to progress to PLOF and address remaining functional goals. (see flow sheet as applicable)    Details if applicable:     15 15 24588 Neuromuscular Re-Education (timed):  improve balance, coordination, kinesthetic sense, posture, core stability and proprioception to improve patient's ability to develop conscious control of individual muscles and awareness of position of extremities in order to progress to PLOF and address remaining functional goals. (see flow sheet as applicable)     Details if applicable:           Details if applicable:            Details if applicable:     48 40 [facility] Totals Reminder: bill using total billable min of TIMED therapeutic procedures (example: do not include dry needle or estim unattended,

## 2024-05-13 ENCOUNTER — HOSPITAL ENCOUNTER (OUTPATIENT)
Facility: HOSPITAL | Age: 39
Setting detail: RECURRING SERIES
Discharge: HOME | End: 2024-05-16
Payer: COMMERCIAL

## 2024-05-13 PROCEDURE — 97112 NEUROMUSCULAR REEDUCATION: CPT

## 2024-05-13 PROCEDURE — 97140 MANUAL THERAPY 1/> REGIONS: CPT

## 2024-05-13 PROCEDURE — 97110 THERAPEUTIC EXERCISES: CPT

## 2024-05-13 NOTE — PROGRESS NOTES
Cbc,cmp (that includes lft),psa,flp,ua,psa   both untimed codes, in totals to left)  8-22 min = 1 unit; 23-37 min = 2 units; 38-52 min = 3 units; 53-67 min = 4 units; 68-82 min = 5 units   Total Total     TOTAL TREATMENT TIME:        45     [x]  Patient Education billed concurrently with other procedures   [x] Review HEP    [] Progressed/Changed HEP, detail:    [] Other detail:       Objective Information/Functional Measures/Assessment    Pt tolerated session well with reporting no pain post session. Pt demonstrating right trunk rotation still with right shoulder IR ROM able to improve to 90* post manual and exercises today. Pt fatigue with TB diagonal and ER with no pain with focusing on scapular recruitment     Patient will continue to benefit from skilled PT / OT services to modify and progress therapeutic interventions, analyze and address functional mobility deficits, analyze and address ROM deficits, analyze and address strength deficits, analyze and address soft tissue restrictions, analyze and cue for proper movement patterns, analyze and modify for postural abnormalities, and instruct in home and community integration to address functional deficits and attain remaining goals.    Progress toward goals / Updated goals:  []  See Progress Note/Recertification    Short Term Goals: To be accomplished in 4 weeks              Patient will  be independent with HEP to increase ease with Adls.   Eval; HEP established   Current: compliance per pt report, updated today progressing 5/13/24  2. Patient will improve right shoulder AAROM flexion and scaption to 150deg without pain to increase ease with overhead activities.               Eval: right shoulder AROM flexion: 104deg with 3/10 pain level, scaption: 120deg with 5/10 pain level reported   3. Patient will report pain no more then 2/10 at end of a work day to increase ease with work related tasks.              Eval: reports 8/10 pain level at the end of a work day.    Current: pt reporting overall less pain

## 2024-05-20 ENCOUNTER — HOSPITAL ENCOUNTER (OUTPATIENT)
Facility: HOSPITAL | Age: 39
Setting detail: RECURRING SERIES
Discharge: HOME | End: 2024-05-23
Payer: COMMERCIAL

## 2024-05-20 PROCEDURE — 97112 NEUROMUSCULAR REEDUCATION: CPT

## 2024-05-20 PROCEDURE — 97140 MANUAL THERAPY 1/> REGIONS: CPT

## 2024-05-20 PROCEDURE — 97110 THERAPEUTIC EXERCISES: CPT

## 2024-05-20 PROCEDURE — 97530 THERAPEUTIC ACTIVITIES: CPT

## 2024-05-20 NOTE — PROGRESS NOTES
without pain to increase ease with overhead activities.               Eval: right shoulder AROM flexion: 104deg with 3/10 pain level, scaption: 120deg with 5/10 pain level reported   Current: 118* flexion progressing 5/20/24   3. Patient will report pain no more then 2/10 at end of a work day to increase ease with work related tasks.              Eval: reports 8/10 pain level at the end of a work day.               Current: pt reporting overall less pain however sharp pain yesterday up to 8/10 at times progressing 5/13/24  Long Term Goals: To be accomplished in 8 weeks  Patient will improve FOTO to at least 70 to demonstrate improved function.   Eval: FOTO: 55  2. Patient will improve right shoulder AROM flexion and scaption to 150deg with pain no more then 1/10 to increase ease with household tasks.               Eval: Eval: right shoulder AROM flexion: 104deg with 3/10 pain level, scaption: 120deg with 5/10 pain level reported   3. Patient will improve right shoulder IR behind back to T7 with pain no more then 1/10 to increase ease with dressing.               Eval: IR behind back: right: L4 (5/10 pain level) left: T7   Current: T7 on right goal MET 5/20/24  4. Patient will improve right shoulder flexion and scaption strength to 4+/5 to increase ease with community ADLs.               Eval: right shoulder strength: flexion: 3+/5, scaption: 3+/5     PLAN  Yes  Continue plan of care  []  Upgrade activities as tolerated  []  Discharge due to :  []  Other:    Debbie Gonzáles, BRIAN    5/20/2024    9:34 AM    Future Appointments   Date Time Provider Department [place]   5/28/2024  9:10 AM [facility] PT [facility]   6/4/2024  9:10 AM Roshni Poon, PT [facility]   6/10/2024  9:10 AM Debbie Gonzáles, BRIAN [facility]   10/2/2024  9:00 AM NURSE ADRIANA OROPEZA [facility]   10/11/2024  9:00 AM Makla Feliz MD [facility]

## 2024-05-28 ENCOUNTER — HOSPITAL ENCOUNTER (OUTPATIENT)
Facility: HOSPITAL | Age: 39
Setting detail: RECURRING SERIES
Discharge: HOME | End: 2024-05-31
Payer: COMMERCIAL

## 2024-05-28 PROCEDURE — 97110 THERAPEUTIC EXERCISES: CPT

## 2024-05-28 PROCEDURE — 97530 THERAPEUTIC ACTIVITIES: CPT

## 2024-05-28 PROCEDURE — 97112 NEUROMUSCULAR REEDUCATION: CPT

## 2024-05-28 NOTE — PROGRESS NOTES
PHYSICAL / OCCUPATIONAL THERAPY - DAILY TREATMENT NOTE     Patient Name: Shania Guajardo    Date: 2024    : 1985  Insurance: Payor: ANKIT / Plan: [facility] / Product Type: *No Product type* /      Patient  verified YES   Visit #   Current / Total 5 16   Time   In / Out 9:14 AM 10:100 AM   Pain   In / Out 0 0   Subjective Functional Status/Changes: Patient reports she has been completing HEP and has noticed improvement and is not having pain this morning.   Changes to:  Allergies, Med Hx, Sx Hx?   no       TREATMENT AREA =  Right shoulder pain [M25.511]    OBJECTIVE    Therapeutic Procedures:  Tx Min Billable or 1:1 Min (if diff from Tx Min) Procedure, Rationale, Specifics   15  61577 Therapeutic Exercise (timed):  increase ROM, strength, coordination, balance, and proprioception to improve patient's ability to progress to PLOF and address remaining functional goals. (see flow sheet as applicable)    Details if applicable:       21  20444 Therapeutic Activity (timed):  use of dynamic activities replicating functional movements to increase ROM, strength, coordination, balance, and proprioception in order to improve patient's ability to progress to PLOF and address remaining functional goals.  (see flow sheet as applicable)    Details if applicable:     10  10333 Neuromuscular Re-Education (timed):  improve balance, coordination, kinesthetic sense, posture, core stability and proprioception to improve patient's ability to develop conscious control of individual muscles and awareness of position of extremities in order to progress to PLOF and address remaining functional goals. (see flow sheet as applicable)     Details if applicable:           Details if applicable:            Details if applicable:     46  [facility] Totals Reminder: bill using total billable min of TIMED therapeutic procedures (example: do not include dry needle or estim unattended, both untimed codes, in totals to left)  8-22 min = 1

## 2024-05-28 NOTE — PROGRESS NOTES
[facility] [place]  [address]  [place]  [address]  Phone: [phone]      Fax:  [phone]    Progress Note  Patient name: Shania Guajardo Start of Care: 2024   Referral source: Chavez Nielsen,* : 1985    Medical Diagnosis: Right shoulder pain [M25.511]  Payor: ANKIT / Plan: [facility] / Product Type: *No Product type* /  Onset Date:2023    Treatment Diagnosis: M25.511 RIGHT SHOULDER PAIN    Prior Hospitalization: see medical history Provider#: [id number]   Medications: Verified on Patient summary List   Comorbidities: back pain, BMI over 30   Prior Level of Function:Patient is right hand dominant. Patient reports being able to work without as much pain prior to 2023.     Visits from Start of Care: 5    Missed Visits: 0    Goals/Measure of Progress:     Short Term Goals: To be accomplished in 4 weeks              Patient will  be independent with HEP to increase ease with Adls.   Eval; HEP established   Current: compliance per pt report, updated today progressing 24  Current PN 2024: MET - Patient reports compliance with HEP and states it has been helping her feel better    2. Patient will improve right shoulder AAROM flexion and scaption to 150deg without pain to increase ease with overhead activities.               Eval: right shoulder AROM flexion: 104deg with 3/10 pain level, scaption: 120deg with 5/10 pain level reported   Current: 118* flexion progressing 24   Current PN 2024: MET - AAROM flexion = 160 deg, AAROM scaption = 155 deg    3. Patient will report pain no more then 2/10 at end of a work day to increase ease with work related tasks.              Eval: reports 8/10 pain level at the end of a work day.               Current: pt reporting overall less pain however sharp pain yesterday up to 8/10 at times progressing 24   Current PN 2024: Progressing - Patient reports max increase in pain

## 2024-06-04 ENCOUNTER — HOSPITAL ENCOUNTER (OUTPATIENT)
Facility: HOSPITAL | Age: 39
Setting detail: RECURRING SERIES
Discharge: HOME | End: 2024-06-07
Payer: COMMERCIAL

## 2024-06-04 PROCEDURE — 97110 THERAPEUTIC EXERCISES: CPT

## 2024-06-04 PROCEDURE — 97016 VASOPNEUMATIC DEVICE THERAPY: CPT

## 2024-06-04 PROCEDURE — 97530 THERAPEUTIC ACTIVITIES: CPT

## 2024-06-04 PROCEDURE — 97140 MANUAL THERAPY 1/> REGIONS: CPT

## 2024-06-04 NOTE — PROGRESS NOTES
PHYSICAL / OCCUPATIONAL THERAPY - DAILY TREATMENT NOTE     Patient Name: Shania Guajardo    Date: 2024    : 1985  Insurance: Payor: ANKIT / Plan: [facility] / Product Type: *No Product type* /      Patient  verified Yes     Visit #   Current / Total 6 16   Time   In / Out 9:10 10:01   Pain   In / Out 2 1   Subjective Functional Status/Changes: Had a painful popping sensation this week that caused increased pain limited my movement.    Changes to:  Allergies, Med Hx, Sx Hx?   no       TREATMENT AREA =  Right shoulder pain [M25.511]    OBJECTIVE    Modalities Rationale:     decrease pain and increase tissue extensibility to improve patient's ability to progress to PLOF and address remaining functional goals.     min [] Estim Unattended, type/location:                                      []  w/ice    []  w/heat    min [] Estim Attended, type/location:                                     []  w/US     []  w/ice    []  w/heat    []  TENS insruct      min []  Mechanical Traction: type/lbs                   []  pro   []  sup   []  int   []  cont    []  before manual    []  after manual    min []  Ultrasound, settings/location:      min []  Iontophoresis w/ dexamethasone, location:                                               []  take home patch       []  in clinic        min  unbilled []  Ice     []  Heat    location/position:     min []  Paraffin,  details:    10 min []  Vasopneumatic Device, press/temp:     min []  Whirlpool / Fluido:    If using vaso (only need to measure limb vaso being performed on)      pre-treatment girth : 39cm      post-treatment girth : 39cm      measured at (landmark location) :  axilla    min []  Other:    Skin assessment post-treatment (if applicable):        Therapeutic Procedures:  Tx Min Billable or 1:1 Min (if diff from Tx Min) Procedure, Rationale, Specifics   18  54252 Therapeutic Exercise (timed):  increase ROM, strength, coordination, balance, and proprioception to

## 2024-06-10 ENCOUNTER — HOSPITAL ENCOUNTER (OUTPATIENT)
Facility: HOSPITAL | Age: 39
Setting detail: RECURRING SERIES
Discharge: HOME | End: 2024-06-13
Payer: COMMERCIAL

## 2024-06-10 PROCEDURE — 97110 THERAPEUTIC EXERCISES: CPT

## 2024-06-10 PROCEDURE — 97016 VASOPNEUMATIC DEVICE THERAPY: CPT

## 2024-06-10 PROCEDURE — 97112 NEUROMUSCULAR REEDUCATION: CPT

## 2024-06-10 PROCEDURE — 97140 MANUAL THERAPY 1/> REGIONS: CPT

## 2024-06-10 NOTE — PROGRESS NOTES
increase ROM, and increase tissue extensibility to improve patient's ability to progress to PLOF and address remaining functional goals.  The manual therapy interventions were performed at a separate and distinct time from the therapeutic activities interventions . Details: posterior and inferior GHJ mobs, rib pumping to improve right apical expansion    Details if applicable:       25 17 97110 Therapeutic Exercise (timed):  increase ROM, strength, coordination, balance, and proprioception to improve patient's ability to progress to PLOF and address remaining functional goals. (see flow sheet as applicable)    Details if applicable:     15 15 37506 Neuromuscular Re-Education (timed):  improve balance, coordination, kinesthetic sense, posture, core stability and proprioception to improve patient's ability to develop conscious control of individual muscles and awareness of position of extremities in order to progress to PLOF and address remaining functional goals. (see flow sheet as applicable)     Details if applicable:           Details if applicable:            Details if applicable:     48 40 [facility] Totals Reminder: bill using total billable min of TIMED therapeutic procedures (example: do not include dry needle or estim unattended, both untimed codes, in totals to left)  8-22 min = 1 unit; 23-37 min = 2 units; 38-52 min = 3 units; 53-67 min = 4 units; 68-82 min = 5 units   Total Total     TOTAL TREATMENT TIME:        58     [x]  Patient Education billed concurrently with other procedures   [x] Review HEP    [] Progressed/Changed HEP, detail:    [] Other detail:       Objective Information/Functional Measures/Assessment    Pt tolerated session well with reporting less pain post session. Good tolerance to scap push up at table and cable column progressions today to improve scapular strength. AROM flexion has decrease since last PN possibly due to UT compensation from increased pain.     Patient will continue to

## 2024-06-18 ENCOUNTER — HOSPITAL ENCOUNTER (OUTPATIENT)
Facility: HOSPITAL | Age: 39
Setting detail: RECURRING SERIES
Discharge: HOME | End: 2024-06-21
Payer: COMMERCIAL

## 2024-06-18 PROCEDURE — 97112 NEUROMUSCULAR REEDUCATION: CPT

## 2024-06-18 PROCEDURE — 97110 THERAPEUTIC EXERCISES: CPT

## 2024-06-18 PROCEDURE — 97530 THERAPEUTIC ACTIVITIES: CPT

## 2024-06-18 PROCEDURE — 97016 VASOPNEUMATIC DEVICE THERAPY: CPT

## 2024-06-18 NOTE — PROGRESS NOTES
skilled PT / OT services to modify and progress therapeutic interventions, analyze and address functional mobility deficits, analyze and address ROM deficits, analyze and address strength deficits, analyze and address soft tissue restrictions, analyze and cue for proper movement patterns, analyze and modify for postural abnormalities, and instruct in home and community integration to address functional deficits and attain remaining goals.    Progress toward goals / Updated goals:  []  See Progress Note/Recertification    Short Term Goals: To be accomplished in 4 weeks              Patient will  be independent with HEP to increase ease with Adls.   Eval; HEP established   Current: compliance per pt report, updated today progressing 5/13/24   PN 5/28/2024: MET - Patient reports compliance with HEP and states it has been helping her feel better     2. Patient will improve right shoulder AAROM flexion and scaption to 150deg without pain to increase ease with overhead activities.               Eval: right shoulder AROM flexion: 104deg with 3/10 pain level, scaption: 120deg with 5/10 pain level reported   PN 5/28/2024: MET - AAROM flexion = 160 deg, AAROM scaption = 155 deg     3. Patient will report pain no more then 2/10 at end of a work day to increase ease with work related tasks.              Eval: reports 8/10 pain level at the end of a work day.              PN 5/28/2024: Progressing - Patient reports max increase in pain to 3/10 after work in last week               Current: 4-5/10 pain at worst in the past week after a work day 6/18/24     Long Term Goals: To be accomplished in 8 weeks  Patient will improve FOTO to at least 70 to demonstrate improved function.   Eval: FOTO: 55   PN 5/28/2024: Progressing - FOTO 65  2. Patient will improve right shoulder AROM flexion and scaption to 150deg with pain no more then 1/10 to increase ease with household tasks.               Eval: Eval: right shoulder AROM flexion:

## 2024-06-26 ENCOUNTER — HOSPITAL ENCOUNTER (OUTPATIENT)
Facility: HOSPITAL | Age: 39
Setting detail: RECURRING SERIES
Discharge: HOME | End: 2024-06-29
Payer: COMMERCIAL

## 2024-06-26 PROCEDURE — 97016 VASOPNEUMATIC DEVICE THERAPY: CPT

## 2024-06-26 PROCEDURE — 97110 THERAPEUTIC EXERCISES: CPT

## 2024-06-26 PROCEDURE — 97530 THERAPEUTIC ACTIVITIES: CPT

## 2024-06-26 PROCEDURE — 97112 NEUROMUSCULAR REEDUCATION: CPT

## 2024-06-26 NOTE — PROGRESS NOTES
[facility] [place]  [address]  [place]  [address]  Phone: [phone]      Fax:  [phone]    Progress Note  Patient name: Shania Guajardo Start of Care: 2024   Referral source: Chavez Nielsen,* : 1985    Medical Diagnosis: Right shoulder pain [M25.511]  Payor: ANKIT / Plan: [facility] / Product Type: *No Product type* /  Onset Date:2023    Treatment Diagnosis: M25.511 RIGHT SHOULDER PAIN    Prior Hospitalization: see medical history Provider#: [id number]   Medications: Verified on Patient summary List   Comorbidities: back pain, BMI over 30   Prior Level of Function:Patient is right hand dominant. Patient reports being able to work without as much pain prior to 2023.     Visits from Start of Care: 9    Missed Visits: 0    Goals/Measure of Progress:     Short Term Goals: To be accomplished in 4 weeks              Patient will  be independent with HEP to increase ease with Adls.   Eval; HEP established   Current: compliance per pt report, updated today progressing 24   PN 2024: MET - Patient reports compliance with HEP and states it has been helping her feel better     2. Patient will improve right shoulder AAROM flexion and scaption to 150deg without pain to increase ease with overhead activities.               Eval: right shoulder AROM flexion: 104deg with 3/10 pain level, scaption: 120deg with 5/10 pain level reported   PN 2024: MET - AAROM flexion = 160 deg, AAROM scaption = 155 deg     3. Patient will report pain no more then 2/10 at end of a work day to increase ease with work related tasks.              Eval: reports 8/10 pain level at the end of a work day.              PN 2024: Progressing - Patient reports max increase in pain to 3/10 after work in last week               Current PN 2024: Progressing - Patient reports pain on a normal day increases to 2/10 with only one instance where pain 
70 to demonstrate improved function.   Eval: FOTO: 55   PN 5/28/2024: Progressing - FOTO 65  Current PN 6/26/2024: MET - FOTO 74    2. Patient will improve right shoulder AROM flexion and scaption to 150deg with pain no more then 1/10 to increase ease with household tasks.               Eval: Eval: right shoulder AROM flexion: 104deg with 3/10 pain level, scaption: 120deg with 5/10 pain level reported               PN 5/28/2024: Progressing - AROM flexion = 135 deg, AROM scaption = 130 deg  Current: AROM flexion 125* regression 6/10/24   Current PN 6/26/2024: MET - AROM Flexion 150 deg, AROM scaption 160 deg    3. Patient will improve right shoulder IR behind back to T7 with pain no more then 1/10 to increase ease with dressing.               Eval: IR behind back: right: L4 (5/10 pain level) left: T7   PN T7 on right goal MET 5/20/24     4. Patient will improve right shoulder flexion and scaption strength to 4+/5 to increase ease with community ADLs.               Eval: right shoulder strength: flexion: 3+/5, scaption: 3+/5               PN 5/28/2024: Progressing - flexion: 4+/5, scaption: 4/5    Current PN 6/26/2024: MET - Flexion and scaption 4+/5    PLAN  Yes  Continue plan of care  []  Upgrade activities as tolerated  []  Discharge due to :  []  Other:    Gilberto Calhoun PT    6/26/2024    8:38 AM    Future Appointments   Date Time Provider Department Center   7/1/2024 12:30 PM [facility]   7/8/2024  8:30 AM [facility]   7/17/2024  1:10 PM [facility]   7/22/2024  8:30 AM [facility]   7/29/2024  8:30 AM [facility]   10/2/2024  9:00 AM NURSE ADRIANA OROPEZA AM[facility]   10/11/2024  9:00 AM Malka Feliz MD [facility]

## 2024-07-01 ENCOUNTER — HOSPITAL ENCOUNTER (OUTPATIENT)
Facility: HOSPITAL | Age: 39
Setting detail: RECURRING SERIES
Discharge: HOME | End: 2024-07-04
Payer: COMMERCIAL

## 2024-07-01 PROCEDURE — 97110 THERAPEUTIC EXERCISES: CPT

## 2024-07-01 PROCEDURE — 97530 THERAPEUTIC ACTIVITIES: CPT

## 2024-07-01 PROCEDURE — 97016 VASOPNEUMATIC DEVICE THERAPY: CPT

## 2024-07-01 PROCEDURE — 97112 NEUROMUSCULAR REEDUCATION: CPT

## 2024-07-01 NOTE — PROGRESS NOTES
PHYSICAL / OCCUPATIONAL THERAPY - DAILY TREATMENT NOTE     Patient Name: Shania Guajardo    Date: 2024    : 1985  Insurance: Payor: ANKIT / Plan: [facility] / Product Type: *No Product type* /      Patient  verified Yes     Visit #   Current / Total 10 16   Time   In / Out 12:34 1:26   Pain   In / Out 1 0   Subjective Functional Status/Changes: \"I only have some trouble with anything overhead that's weighted.\"   Changes to:  Allergies, Med Hx, Sx Hx?   no       TREATMENT AREA =  Right shoulder pain [M25.511]    OBJECTIVE    Modalities Rationale:     decrease pain to improve patient's ability to progress to PLOF and address remaining functional goals.     min [] Estim Unattended, type/location:                                      []  w/ice    []  w/heat    min [] Estim Attended, type/location:                                     []  w/US     []  w/ice    []  w/heat    []  TENS insruct      min []  Mechanical Traction: type/lbs                   []  pro   []  sup   []  int   []  cont    []  before manual    []  after manual    min []  Ultrasound, settings/location:      min []  Iontophoresis w/ dexamethasone, location:                                               []  take home patch       []  in clinic        min  unbilled []  Ice     []  Heat    location/position:     min []  Paraffin,  details:    10 min [x]  Vasopneumatic Device, press/temp: Low/Low    min []  Whirlpool / Fluido:    If using vaso (only need to measure limb vaso being performed on)      pre-treatment girth : 39 cm      post-treatment girth : 38.4 cm      measured at (landmark location) :  Axilla    min []  Other:    Skin assessment post-treatment (if applicable):    [x]  intact    []  redness- no adverse reaction                 []redness - adverse reaction:         Therapeutic Procedures:  Tx Min Billable or 1:1 Min (if diff from Tx Min) Procedure, Rationale, Specifics   20  15200 Therapeutic Exercise (timed):  increase ROM,

## 2024-07-08 ENCOUNTER — HOSPITAL ENCOUNTER (OUTPATIENT)
Facility: HOSPITAL | Age: 39
Setting detail: RECURRING SERIES
Discharge: HOME | End: 2024-07-11
Payer: COMMERCIAL

## 2024-07-08 PROCEDURE — 97530 THERAPEUTIC ACTIVITIES: CPT

## 2024-07-08 PROCEDURE — 97110 THERAPEUTIC EXERCISES: CPT

## 2024-07-08 PROCEDURE — 97112 NEUROMUSCULAR REEDUCATION: CPT

## 2024-07-08 PROCEDURE — 97016 VASOPNEUMATIC DEVICE THERAPY: CPT

## 2024-07-08 NOTE — PROGRESS NOTES
skilled PT / OT services to modify and progress therapeutic interventions, analyze and address functional mobility deficits, analyze and address ROM deficits, analyze and address strength deficits, analyze and address soft tissue restrictions, analyze and cue for proper movement patterns, analyze and modify for postural abnormalities, and instruct in home and community integration to address functional deficits and attain remaining goals.    Progress toward goals / Updated goals:  []  See Progress Note/Recertification    Short Term Goals: To be accomplished in 4 weeks              Patient will  be independent with HEP to increase ease with Adls.   Eval; HEP established    PN 5/28/2024: MET - Patient reports compliance with HEP and states it has been helping her feel better     2. Patient will improve right shoulder AAROM flexion and scaption to 150deg without pain to increase ease with overhead activities.               Eval: right shoulder AROM flexion: 104deg with 3/10 pain level, scaption: 120deg with 5/10 pain level reported   PN 5/28/2024: MET - AAROM flexion = 160 deg, AAROM scaption = 155 deg     3. Patient will report pain no more then 2/10 at end of a work day to increase ease with work related tasks.              Eval: reports 8/10 pain level at the end of a work day.               Current PN 6/26/2024: Progressing - Patient reports pain on a normal day increases to 2/10 with only one instance where pain increased greater than 2/10 in last week              Current: Pt reports 2/10 max pain. Pt is able to sleep on right side and only has mild difficulty with overhead tasks.  MET 7/8/24     Long Term Goals: To be accomplished in 8 weeks  Patient will improve FOTO to at least 70 to demonstrate improved function.   Eval: FOTO: 55  Current PN 6/26/2024: MET - FOTO 74     2. Patient will improve right shoulder AROM flexion and scaption to 150deg with pain no more then 1/10 to increase ease with household tasks.

## 2024-07-17 ENCOUNTER — HOSPITAL ENCOUNTER (OUTPATIENT)
Facility: HOSPITAL | Age: 39
Setting detail: RECURRING SERIES
Discharge: HOME | End: 2024-07-20
Payer: COMMERCIAL

## 2024-07-17 PROCEDURE — 97110 THERAPEUTIC EXERCISES: CPT

## 2024-07-17 PROCEDURE — 97112 NEUROMUSCULAR REEDUCATION: CPT

## 2024-07-17 PROCEDURE — 97530 THERAPEUTIC ACTIVITIES: CPT

## 2024-07-17 NOTE — PROGRESS NOTES
PHYSICAL / OCCUPATIONAL THERAPY - DAILY TREATMENT NOTE     Patient Name: Shania Guajardo    Date: 2024    : 1985  Insurance: Payor: [employer] / Plan: [facility] [place] / Product Type: *No Product type* /      Patient  verified Yes     Visit #   Current / Total 12 24   Time   In / Out 1:10 1:52   Pain   In / Out 0 0   Subjective Functional Status/Changes: Patient reports improved mobility and no longer getting sharp pains in right shoulder, but continues to have difficulty with lifting weight overhead.    Changes to:  Allergies, Med Hx, Sx Hx?   no       TREATMENT AREA =  Right shoulder pain [M25.511]    OBJECTIVE    Modalities Rationale:     patient declined     Therapeutic Procedures:  Tx Min Billable or 1:1 Min (if diff from Tx Min) Procedure, Rationale, Specifics   22  79133 Therapeutic Exercise (timed):  increase ROM, strength, coordination, balance, and proprioception to improve patient's ability to progress to PLOF and address remaining functional goals. (see flow sheet as applicable)    Details if applicable:    Chest/bicep stretch- [id number] hold.    12  14167 Neuromuscular Re-Education (timed):  improve balance, coordination, kinesthetic sense, posture, core stability and proprioception to improve patient's ability to develop conscious control of individual muscles and awareness of position of extremities in order to progress to PLOF and address remaining functional goals. (see flow sheet as applicable)    Details if applicable:  horizontal abduction in supine: BTB: 10x    8  51889 Therapeutic Activity (timed):  use of dynamic activities replicating functional movements to increase ROM, strength, coordination, balance, and proprioception in order to improve patient's ability to progress to PLOF and address remaining functional goals.  (see flow sheet as applicable)     Details if applicable:           Details if applicable:            Details if applicable:     42  [facility] Totals Reminder: bill using

## 2024-07-22 ENCOUNTER — HOSPITAL ENCOUNTER (OUTPATIENT)
Facility: HOSPITAL | Age: 39
Setting detail: RECURRING SERIES
Discharge: HOME | End: 2024-07-25
Payer: COMMERCIAL

## 2024-07-22 PROCEDURE — 97110 THERAPEUTIC EXERCISES: CPT

## 2024-07-22 PROCEDURE — 97530 THERAPEUTIC ACTIVITIES: CPT

## 2024-07-22 PROCEDURE — 97112 NEUROMUSCULAR REEDUCATION: CPT

## 2024-07-22 NOTE — PROGRESS NOTES
[facility]  [address]  [place]  [address]  Phone: [phone]      Fax:  [phone]    Physical Therapy Discharge Summary    Patient name: Shania Guajardo Start of Care: 24   Referral source: Chavez Nielsen,* : 1985   Medical/Treatment Diagnosis: Right shoulder pain [M25.511] Onset Date:23     Prior Hospitalization: see medical history Provider#: [id number]   Medications: Verified on Patient Summary List    Comorbidities:  back pain, BMI over 30   Prior Level of Function:Patient is right hand dominant. Patient reports being able to work without as much pain prior to 2023.     If an interpreting service is utilized for treatment of this patient, the contents of this document represent the material reviewed with the patient via the .     Visits from Start of Care: 13    Missed Visits: 0    Reporting Period : 24 to 24    Goals/Measure of Progress:  Short Term Goals: To be accomplished in 4 weeks              Patient will  be independent with HEP to increase ease with Adls.   Eval; HEP established    PN 2024: MET - Patient reports compliance with HEP and states it has been helping her feel better     2. Patient will improve right shoulder AAROM flexion and scaption to 150deg without pain to increase ease with overhead activities.               Eval: right shoulder AROM flexion: 104deg with 3/10 pain level, scaption: 120deg with 5/10 pain level reported   PN 2024: MET - AAROM flexion = 160 deg, AAROM scaption = 155 deg     3. Patient will report pain no more then 2/10 at end of a work day to increase ease with work related tasks.              Eval: reports 8/10 pain level at the end of a work day.   24 PN: Pt reports 2/10 max pain with ADL's/overhead tasks MET     Long Term Goals: To be accomplished in 8 weeks  Patient will improve FOTO to at least 70 to demonstrate improved function.   Eval: 
more then 2/10 at end of a work day to increase ease with work related tasks.              Eval: reports 8/10 pain level at the end of a work day.   7/22/24 PN: Pt reports 2/10 max pain with ADL's/overhead tasks MET     Long Term Goals: To be accomplished in 8 weeks  Patient will improve FOTO to at least 70 to demonstrate improved function.   Eval: FOTO: 55  Current PN 6/26/2024: MET - FOTO 74     2. Patient will improve right shoulder AROM flexion and scaption to 150deg with pain no more then 1/10 to increase ease with household tasks.               Eval: Eval: right shoulder AROM flexion: 104deg with 3/10 pain level, scaption: 120deg with 5/10 pain level reported   Current PN 6/26/2024: MET - AROM Flexion 150 deg, AROM scaption 160 deg     3. Patient will improve right shoulder IR behind back to T7 with pain no more then 1/10 to increase ease with dressing.               Eval: IR behind back: right: L4 (5/10 pain level) left: T7   PN T7 on right goal MET 5/20/24     4. Patient will improve right shoulder flexion and scaption strength to 4+/5 to increase ease with community ADLs.               Eval: right shoulder strength: flexion: 3+/5, scaption: 3+/5               Current PN 6/26/2024: MET - Flexion and scaption 4+/5    PLAN  No  Continue plan of care  []  Upgrade activities as tolerated  [x]  Discharge due to : All goals MET/Pt request to discharge to [facility]  []  Other:    Nick Troy PTA    7/22/2024    7:31 AM    Future Appointments   Date Time Provider Department Center   7/22/2024  8:30 AM Nick Troy PTA [facility]   7/29/2024  8:30 AM Debbie Gonzáles PT [facility]   10/2/2024  9:00 AM NURSE [facility] OROPEZA [facility]   10/11/2024  9:00 AM Malka Feliz MD [facility]

## 2024-07-29 ENCOUNTER — APPOINTMENT (OUTPATIENT)
Facility: HOSPITAL | Age: 39
End: 2024-07-29
Payer: COMMERCIAL

## 2024-09-05 ENCOUNTER — OFFICE VISIT (OUTPATIENT)
Facility: CLINIC | Age: 39
End: 2024-09-05
Payer: COMMERCIAL

## 2024-09-05 VITALS
HEIGHT: 65 IN | TEMPERATURE: 97.8 F | SYSTOLIC BLOOD PRESSURE: 110 MMHG | HEART RATE: 75 BPM | OXYGEN SATURATION: 98 % | BODY MASS INDEX: 31.99 KG/M2 | WEIGHT: 192 LBS | DIASTOLIC BLOOD PRESSURE: 74 MMHG

## 2024-09-05 DIAGNOSIS — B08.4 HAND, FOOT AND MOUTH DISEASE: Primary | ICD-10-CM

## 2024-09-05 PROCEDURE — 99213 OFFICE O/P EST LOW 20 MIN: CPT | Performed by: STUDENT IN AN ORGANIZED HEALTH CARE EDUCATION/TRAINING PROGRAM

## 2024-09-05 NOTE — PROGRESS NOTES
Chief Complaint   Patient presents with    Rash     Patient states she noticed on yesterday she has red spots all over both hands that started on her finder tips and spread to her palms. She says this has spread to the back of her hands and her hands are painful.            \"Have you been to the ER, urgent care clinic since your last visit?  Hospitalized since your last visit?\"    NO    “Have you seen or consulted any other health care providers outside of [facility] since your last visit?”    NO            Click Here for Release of Records Request

## 2024-09-05 NOTE — PROGRESS NOTES
Shania Guajardo (: 1985) is a 39 y.o. female, established patient, here for evaluation of the following chief complaint(s):  Rash (Patient states she noticed on yesterday she has red spots all over both hands that started on her finder tips and spread to her palms. She says this has spread to the back of her hands and her hands are painful. )        Assessment & Plan  1. Hand-foot-and-mouth disease.  Symptoms are consistent with hand-foot-and-mouth disease, caused by the Coxsackie virus. The condition is characterized by painful, nodular papules on the hands and feet, which eventually scab over and flake off. It is primarily spread through oral secretions or fecal transmission and can be contracted from direct contact with an infected individual. The disease is most contagious during the first 5 days of illness, and then less so afterwards. There is no specific treatment for the virus; it must run its course. Over-the-counter pain relievers such as Tylenol or ibuprofen can be used to alleviate discomfort. Good hygiene, including frequent hand washing, is advised to prevent further spread of the disease.      Results    1. Hand, foot and mouth disease    Return if symptoms worsen or fail to improve.         Subjective   History of Present Illness  The patient presents for evaluation of hand pain.    She reports that her fingertips began to hurt yesterday, accompanied by the appearance of dark red dots. These dots are described as divots beneath the skin surface, causing a sensation similar to pins and needles. Some of these spots have developed into bumps. The condition is symmetrical, affecting both hands equally.    The symptoms initially appeared on her palms and then spread to the sides of her fingers upon waking this morning. They have now reached the back of her fingers, the back of her hand, and are starting to affect her wrist.     She is concerned about the contagious nature of her condition and

## 2024-10-02 ENCOUNTER — HOSPITAL ENCOUNTER (OUTPATIENT)
Facility: HOSPITAL | Age: 39
Setting detail: SPECIMEN
Discharge: HOME | End: 2024-10-05
Payer: COMMERCIAL

## 2024-10-02 ENCOUNTER — NURSE ONLY (OUTPATIENT)
Facility: CLINIC | Age: 39
End: 2024-10-02
Payer: COMMERCIAL

## 2024-10-02 DIAGNOSIS — D50.0 IRON DEFICIENCY ANEMIA DUE TO CHRONIC BLOOD LOSS: Primary | ICD-10-CM

## 2024-10-02 DIAGNOSIS — Z11.59 NEED FOR HEPATITIS C SCREENING TEST: ICD-10-CM

## 2024-10-02 DIAGNOSIS — D50.0 IRON DEFICIENCY ANEMIA DUE TO CHRONIC BLOOD LOSS: ICD-10-CM

## 2024-10-02 DIAGNOSIS — N92.0 MENORRHAGIA WITH REGULAR CYCLE: ICD-10-CM

## 2024-10-02 LAB
BASOPHILS # BLD: 0.1 K/UL (ref 0–0.1)
BASOPHILS NFR BLD: 1 % (ref 0–2)
DIFFERENTIAL METHOD BLD: ABNORMAL
EOSINOPHIL # BLD: 0.2 K/UL (ref 0–0.4)
EOSINOPHIL NFR BLD: 2 % (ref 0–5)
ERYTHROCYTE [DISTWIDTH] IN BLOOD BY AUTOMATED COUNT: 14.5 % (ref 11.6–14.5)
FERRITIN SERPL-MCNC: 27 NG/ML (ref 8–388)
HCT VFR BLD AUTO: 39.4 % (ref 35–45)
HGB BLD-MCNC: 12.1 G/DL (ref 12–16)
IMM GRANULOCYTES # BLD AUTO: 0 K/UL (ref 0–0.04)
IMM GRANULOCYTES NFR BLD AUTO: 0 % (ref 0–0.5)
IRON SATN MFR SERPL: 16 % (ref 20–50)
IRON SERPL-MCNC: 52 UG/DL (ref 50–175)
LYMPHOCYTES # BLD: 2.7 K/UL (ref 0.9–3.6)
LYMPHOCYTES NFR BLD: 36 % (ref 21–52)
MCH RBC QN AUTO: 28.1 PG (ref 24–34)
MCHC RBC AUTO-ENTMCNC: 30.7 G/DL (ref 31–37)
MCV RBC AUTO: 91.6 FL (ref 78–100)
MONOCYTES # BLD: 0.5 K/UL (ref 0.05–1.2)
MONOCYTES NFR BLD: 7 % (ref 3–10)
NEUTS SEG # BLD: 4 K/UL (ref 1.8–8)
NEUTS SEG NFR BLD: 54 % (ref 40–73)
NRBC # BLD: 0 K/UL (ref 0–0.01)
NRBC BLD-RTO: 0 PER 100 WBC
PLATELET # BLD AUTO: 361 K/UL (ref 135–420)
PMV BLD AUTO: 9.5 FL (ref 9.2–11.8)
RBC # BLD AUTO: 4.3 M/UL (ref 4.2–5.3)
TIBC SERPL-MCNC: 327 UG/DL (ref 250–450)
WBC # BLD AUTO: 7.4 K/UL (ref 4.6–13.2)

## 2024-10-02 PROCEDURE — 86803 HEPATITIS C AB TEST: CPT

## 2024-10-02 PROCEDURE — 82728 ASSAY OF FERRITIN: CPT

## 2024-10-02 PROCEDURE — 85025 COMPLETE CBC W/AUTO DIFF WBC: CPT

## 2024-10-02 PROCEDURE — 36415 COLL VENOUS BLD VENIPUNCTURE: CPT | Performed by: FAMILY MEDICINE

## 2024-10-02 PROCEDURE — 83540 ASSAY OF IRON: CPT

## 2024-10-02 PROCEDURE — 83550 IRON BINDING TEST: CPT

## 2024-10-03 LAB
HCV AB SER IA-ACNC: 0.25 INDEX
HCV AB SERPL QL IA: NEGATIVE
HEPATITIS C COMMENT: NORMAL

## 2024-10-11 ENCOUNTER — OFFICE VISIT (OUTPATIENT)
Facility: CLINIC | Age: 39
End: 2024-10-11

## 2024-10-11 VITALS
WEIGHT: 197.6 LBS | OXYGEN SATURATION: 97 % | TEMPERATURE: 97.4 F | HEART RATE: 58 BPM | DIASTOLIC BLOOD PRESSURE: 76 MMHG | SYSTOLIC BLOOD PRESSURE: 124 MMHG | HEIGHT: 65 IN | BODY MASS INDEX: 32.92 KG/M2

## 2024-10-11 DIAGNOSIS — D50.0 IRON DEFICIENCY ANEMIA DUE TO CHRONIC BLOOD LOSS: Primary | ICD-10-CM

## 2024-10-11 DIAGNOSIS — E53.8 VITAMIN B12 DEFICIENCY: ICD-10-CM

## 2024-10-11 DIAGNOSIS — E28.2 PCOS (POLYCYSTIC OVARIAN SYNDROME): ICD-10-CM

## 2024-10-11 DIAGNOSIS — N92.0 MENORRHAGIA WITH REGULAR CYCLE: ICD-10-CM

## 2024-10-11 DIAGNOSIS — R73.03 PREDIABETES: ICD-10-CM

## 2024-10-11 RX ORDER — FERROUS SULFATE 325(65) MG
325 TABLET ORAL DAILY
Qty: 90 TABLET | Refills: 3 | Status: SHIPPED | OUTPATIENT
Start: 2024-10-11

## 2024-10-11 RX ORDER — MULTIVITAMIN WITH IRON
250 TABLET ORAL DAILY
COMMUNITY

## 2024-10-11 RX ORDER — DOCUSATE SODIUM 100 MG/1
100 CAPSULE, LIQUID FILLED ORAL DAILY
Qty: 90 CAPSULE | Refills: 3 | Status: SHIPPED | OUTPATIENT
Start: 2024-10-11

## 2024-10-11 NOTE — PROGRESS NOTES
Chief Complaint   Patient presents with    Anemia     Patient is being seen for her 6 month follow up and lab review.        \"Have you been to the ER, urgent care clinic since your last visit?  Hospitalized since your last visit?\"    NO    “Have you seen or consulted any other health care providers outside our system since your last visit?”    NO             
taking: Reported on 9/5/2024) 30 tablet 3     No current facility-administered medications for this visit.            /76 (Site: Left Upper Arm, Position: Sitting)   Pulse 58   Temp 97.4 °F (36.3 °C) (Tympanic)   Ht 1.651 m (5' 5\")   Wt 89.6 kg (197 lb 9.6 oz)   SpO2 97%   Breastfeeding No   BMI 32.88 kg/m²    Physical Exam  Constitutional:       General: She is not in acute distress.     Appearance: Normal appearance.   HENT:      Head: Normocephalic and atraumatic.   Pulmonary:      Effort: Pulmonary effort is normal.   Neurological:      Mental Status: She is alert and oriented to person, place, and time.       Physical Exam         The patient (or guardian, if applicable) and other individuals in attendance with the patient were advised that Artificial Intelligence will be utilized during this visit to record and process the conversation to generate a clinical note. The patient (or guardian, if applicable) and other individuals in attendance at the appointment consented to the use of AI, including the recording.    -- Malka Feliz MD

## 2024-10-11 NOTE — ASSESSMENT & PLAN NOTE
Attributed to menorrhagia. Uncontrolled. She has experienced constipation with oral iron supplements. A stool softener will be added to her regimen to alleviate constipation. If oral iron remains intolerable, a referral to a hematologist for potential iron infusion therapy will be considered. Her iron status will be reassessed at her next visit.  
Resume replacement. Her B12 deficiency will be reassessed at her next visit.   
coughing

## 2025-05-23 ENCOUNTER — CLINICAL SUPPORT (OUTPATIENT)
Facility: CLINIC | Age: 40
End: 2025-05-23
Payer: COMMERCIAL

## 2025-05-23 ENCOUNTER — HOSPITAL ENCOUNTER (OUTPATIENT)
Facility: HOSPITAL | Age: 40
Setting detail: SPECIMEN
Discharge: HOME | End: 2025-05-26
Payer: COMMERCIAL

## 2025-05-23 DIAGNOSIS — N92.0 MENORRHAGIA WITH REGULAR CYCLE: ICD-10-CM

## 2025-05-23 DIAGNOSIS — D50.0 IRON DEFICIENCY ANEMIA DUE TO CHRONIC BLOOD LOSS: ICD-10-CM

## 2025-05-23 DIAGNOSIS — R73.03 PREDIABETES: ICD-10-CM

## 2025-05-23 DIAGNOSIS — E53.8 VITAMIN B12 DEFICIENCY: ICD-10-CM

## 2025-05-23 DIAGNOSIS — D50.0 IRON DEFICIENCY ANEMIA DUE TO CHRONIC BLOOD LOSS: Primary | ICD-10-CM

## 2025-05-23 LAB
ALBUMIN SERPL-MCNC: 3.8 G/DL (ref 3.4–5)
ALBUMIN/GLOB SERPL: 1.1 (ref 0.8–1.7)
ALP SERPL-CCNC: 49 U/L (ref 45–117)
ALT SERPL-CCNC: 17 U/L (ref 10–35)
ANION GAP SERPL CALC-SCNC: 11 MMOL/L (ref 3–18)
AST SERPL-CCNC: 26 U/L (ref 10–38)
BASOPHILS # BLD: 0.05 K/UL (ref 0–0.1)
BASOPHILS NFR BLD: 0.8 % (ref 0–2)
BILIRUB SERPL-MCNC: 0.2 MG/DL (ref 0.2–1)
BUN SERPL-MCNC: 9 MG/DL (ref 6–23)
BUN/CREAT SERPL: 12 (ref 12–20)
CALCIUM SERPL-MCNC: 9.6 MG/DL (ref 8.5–10.1)
CHLORIDE SERPL-SCNC: 102 MMOL/L (ref 98–107)
CO2 SERPL-SCNC: 25 MMOL/L (ref 21–32)
CREAT SERPL-MCNC: 0.76 MG/DL (ref 0.6–1.3)
DIFFERENTIAL METHOD BLD: ABNORMAL
EOSINOPHIL # BLD: 0.11 K/UL (ref 0–0.4)
EOSINOPHIL NFR BLD: 1.7 % (ref 0–5)
ERYTHROCYTE [DISTWIDTH] IN BLOOD BY AUTOMATED COUNT: 13 % (ref 11.6–14.5)
EST. AVERAGE GLUCOSE BLD GHB EST-MCNC: 120 MG/DL
FERRITIN SERPL-MCNC: 56 NG/ML (ref 13–400)
GLOBULIN SER CALC-MCNC: 3.5 G/DL (ref 2–4)
GLUCOSE SERPL-MCNC: 91 MG/DL (ref 74–108)
HBA1C MFR BLD: 5.8 % (ref 4.2–5.6)
HCT VFR BLD AUTO: 38.4 % (ref 35–45)
HGB BLD-MCNC: 11.8 G/DL (ref 12–16)
IMM GRANULOCYTES # BLD AUTO: 0.01 K/UL (ref 0–0.04)
IMM GRANULOCYTES NFR BLD AUTO: 0.2 % (ref 0–0.5)
IRON SATN MFR SERPL: 22 %
IRON SERPL-MCNC: 65 UG/DL (ref 50–175)
LYMPHOCYTES # BLD: 2.59 K/UL (ref 0.9–3.6)
LYMPHOCYTES NFR BLD: 40.5 % (ref 21–52)
MCH RBC QN AUTO: 28.5 PG (ref 24–34)
MCHC RBC AUTO-ENTMCNC: 30.7 G/DL (ref 31–37)
MCV RBC AUTO: 92.8 FL (ref 78–100)
MONOCYTES # BLD: 0.57 K/UL (ref 0.05–1.2)
MONOCYTES NFR BLD: 8.9 % (ref 3–10)
NEUTS SEG # BLD: 3.07 K/UL (ref 1.8–8)
NEUTS SEG NFR BLD: 47.9 % (ref 40–73)
NRBC # BLD: 0 K/UL (ref 0–0.01)
NRBC BLD-RTO: 0 PER 100 WBC
PLATELET # BLD AUTO: 353 K/UL (ref 135–420)
PMV BLD AUTO: 9.6 FL (ref 9.2–11.8)
POTASSIUM SERPL-SCNC: 4.5 MMOL/L (ref 3.5–5.5)
PROT SERPL-MCNC: 7.3 G/DL (ref 6.4–8.2)
RBC # BLD AUTO: 4.14 M/UL (ref 4.2–5.3)
SODIUM SERPL-SCNC: 138 MMOL/L (ref 136–145)
TIBC SERPL-MCNC: 296 UG/DL (ref 250–450)
UIBC SERPL-MCNC: 231 UG/DL (ref 112–347)
VIT B12 SERPL-MCNC: 623 PG/ML (ref 211–911)
WBC # BLD AUTO: 6.4 K/UL (ref 4.6–13.2)

## 2025-05-23 PROCEDURE — 83550 IRON BINDING TEST: CPT

## 2025-05-23 PROCEDURE — 82728 ASSAY OF FERRITIN: CPT

## 2025-05-23 PROCEDURE — 82607 VITAMIN B-12: CPT

## 2025-05-23 PROCEDURE — 80053 COMPREHEN METABOLIC PANEL: CPT

## 2025-05-23 PROCEDURE — 85025 COMPLETE CBC W/AUTO DIFF WBC: CPT

## 2025-05-23 PROCEDURE — 83540 ASSAY OF IRON: CPT

## 2025-05-23 PROCEDURE — 36415 COLL VENOUS BLD VENIPUNCTURE: CPT | Performed by: FAMILY MEDICINE

## 2025-05-23 PROCEDURE — 83036 HEMOGLOBIN GLYCOSYLATED A1C: CPT

## 2025-05-29 SDOH — ECONOMIC STABILITY: FOOD INSECURITY: WITHIN THE PAST 12 MONTHS, YOU WORRIED THAT YOUR FOOD WOULD RUN OUT BEFORE YOU GOT MONEY TO BUY MORE.: NEVER TRUE

## 2025-05-29 SDOH — ECONOMIC STABILITY: FOOD INSECURITY: WITHIN THE PAST 12 MONTHS, THE FOOD YOU BOUGHT JUST DIDN'T LAST AND YOU DIDN'T HAVE MONEY TO GET MORE.: NEVER TRUE

## 2025-05-29 SDOH — ECONOMIC STABILITY: INCOME INSECURITY: IN THE LAST 12 MONTHS, WAS THERE A TIME WHEN YOU WERE NOT ABLE TO PAY THE MORTGAGE OR RENT ON TIME?: NO

## 2025-05-29 SDOH — ECONOMIC STABILITY: TRANSPORTATION INSECURITY
IN THE PAST 12 MONTHS, HAS LACK OF TRANSPORTATION KEPT YOU FROM MEETINGS, WORK, OR FROM GETTING THINGS NEEDED FOR DAILY LIVING?: NO

## 2025-05-29 SDOH — ECONOMIC STABILITY: TRANSPORTATION INSECURITY
IN THE PAST 12 MONTHS, HAS THE LACK OF TRANSPORTATION KEPT YOU FROM MEDICAL APPOINTMENTS OR FROM GETTING MEDICATIONS?: NO

## 2025-05-30 ENCOUNTER — OFFICE VISIT (OUTPATIENT)
Facility: CLINIC | Age: 40
End: 2025-05-30
Payer: COMMERCIAL

## 2025-05-30 VITALS
TEMPERATURE: 98.3 F | SYSTOLIC BLOOD PRESSURE: 122 MMHG | HEART RATE: 57 BPM | DIASTOLIC BLOOD PRESSURE: 72 MMHG | OXYGEN SATURATION: 99 % | WEIGHT: 216.8 LBS | BODY MASS INDEX: 36.12 KG/M2 | HEIGHT: 65 IN

## 2025-05-30 DIAGNOSIS — E66.812 CLASS 2 SEVERE OBESITY WITH SERIOUS COMORBIDITY IN ADULT, UNSPECIFIED BMI, UNSPECIFIED OBESITY TYPE (HCC): ICD-10-CM

## 2025-05-30 DIAGNOSIS — E53.8 VITAMIN B12 DEFICIENCY: ICD-10-CM

## 2025-05-30 DIAGNOSIS — E28.2 PCOS (POLYCYSTIC OVARIAN SYNDROME): Primary | ICD-10-CM

## 2025-05-30 DIAGNOSIS — R73.03 PREDIABETES: ICD-10-CM

## 2025-05-30 DIAGNOSIS — Z13.220 SCREENING, LIPID: ICD-10-CM

## 2025-05-30 DIAGNOSIS — E66.01 CLASS 2 SEVERE OBESITY WITH SERIOUS COMORBIDITY IN ADULT, UNSPECIFIED BMI, UNSPECIFIED OBESITY TYPE (HCC): ICD-10-CM

## 2025-05-30 DIAGNOSIS — D50.0 IRON DEFICIENCY ANEMIA DUE TO CHRONIC BLOOD LOSS: ICD-10-CM

## 2025-05-30 PROCEDURE — 99214 OFFICE O/P EST MOD 30 MIN: CPT | Performed by: FAMILY MEDICINE

## 2025-05-30 RX ORDER — METFORMIN HYDROCHLORIDE 500 MG/1
1500 TABLET, EXTENDED RELEASE ORAL
Qty: 270 TABLET | Refills: 3 | Status: SHIPPED | OUTPATIENT
Start: 2025-05-30

## 2025-05-30 ASSESSMENT — PATIENT HEALTH QUESTIONNAIRE - PHQ9
SUM OF ALL RESPONSES TO PHQ QUESTIONS 1-9: 0
1. LITTLE INTEREST OR PLEASURE IN DOING THINGS: NOT AT ALL
SUM OF ALL RESPONSES TO PHQ QUESTIONS 1-9: 0
2. FEELING DOWN, DEPRESSED OR HOPELESS: NOT AT ALL

## 2025-05-30 NOTE — PROGRESS NOTES
Chief Complaint   Patient presents with    Iron def    Pre-DM    Polycystic Ovarian Syndrome    Vitamin B12 def     Patient here today to be seen for follow up and lab review.        Have you been to the ER, urgent care clinic since your last visit?  Hospitalized since your last visit?   NO    Have you seen or consulted any other health care providers outside our system since your last visit?   NO    Have you had a mammogram?”   YES - Where: This week [facility]  Nurse/CMA to request most recent records if not in the chart    No breast cancer screening on file              
ug/dL Final    TIBC 05/23/2025 296  250 - 450 ug/dL Final    Iron % Saturation 05/23/2025 22  % Final    UIBC 05/23/2025 231.0  112.0 - 347.0 ug/dL Final    Hemoglobin A1C 05/23/2025 5.8 (H)  4.2 - 5.6 % Final    Comment: Reference Range  Normal       <5.7%  Prediabetes  5.7-6.4%  Diabetes     >6.4%      Estimated Avg Glucose 05/23/2025 120  mg/dL Final    WBC 05/23/2025 6.4  4.6 - 13.2 K/uL Final    RBC 05/23/2025 4.14 (L)  4.20 - 5.30 M/uL Final    Hemoglobin 05/23/2025 11.8 (L)  12.0 - 16.0 g/dL Final    Hematocrit 05/23/2025 38.4  35.0 - 45.0 % Final    MCV 05/23/2025 92.8  78.0 - 100.0 FL Final    MCH 05/23/2025 28.5  24.0 - 34.0 PG Final    MCHC 05/23/2025 30.7 (L)  31.0 - 37.0 g/dL Final    RDW 05/23/2025 13.0  11.6 - 14.5 % Final    Platelets 05/23/2025 353  135 - 420 K/uL Final    MPV 05/23/2025 9.6  9.2 - 11.8 FL Final    Nucleated RBCs 05/23/2025 0.0  0  WBC Final    nRBC 05/23/2025 0.00  0.00 - 0.01 K/uL Final    Neutrophils % 05/23/2025 47.9  40.0 - 73.0 % Final    Lymphocytes % 05/23/2025 40.5  21.0 - 52.0 % Final    Monocytes % 05/23/2025 8.9  3.0 - 10.0 % Final    Eosinophils % 05/23/2025 1.7  0.0 - 5.0 % Final    Basophils % 05/23/2025 0.8  0.0 - 2.0 % Final    Immature Granulocytes % 05/23/2025 0.2  0.0 - 0.5 % Final    Neutrophils Absolute 05/23/2025 3.07  1.80 - 8.00 K/UL Final    Lymphocytes Absolute 05/23/2025 2.59  0.90 - 3.60 K/UL Final    Monocytes Absolute 05/23/2025 0.57  0.05 - 1.20 K/UL Final    Eosinophils Absolute 05/23/2025 0.11  0.00 - 0.40 K/UL Final    Basophils Absolute 05/23/2025 0.05  0.00 - 0.10 K/UL Final    Immature Granulocytes Absolute 05/23/2025 0.01  0.00 - 0.04 K/UL Final    Differential Type 05/23/2025 AUTOMATED    Final    Sodium 05/23/2025 138  136 - 145 mmol/L Final    Potassium 05/23/2025 4.5  3.5 - 5.5 mmol/L Final    Chloride 05/23/2025 102  98 - 107 mmol/L Final    CO2 05/23/2025 25  21 - 32 mmol/L Final    Anion Gap 05/23/2025 11  3.0 - 18.0 mmol/L Final

## 2025-05-30 NOTE — ASSESSMENT & PLAN NOTE
menometrorrhagia, hirsutism, preDM, obesity:   -resume metformin  mg, 3264-2024 mg daily pending tolerance

## 2025-05-30 NOTE — PATIENT INSTRUCTIONS
Saumya Morales MD - social media, book \"The New Menopause\", website \"The Pause Life\"    Laura Soto MD - website, book \"Unlock your Menopause Type\"    \"Estrogen Matters\" book by Johnson Solomon MD and Berenice Messer PhD    [employer] Menopause Society (NAMS) - website with resources

## 2025-05-30 NOTE — ASSESSMENT & PLAN NOTE
PCOS. PreDM. Uncontrolled. Engaged with therapeutic lifestyle interventions without results of previous efforts  - metformin for PCOS/PreDM  - consider perimenopause: resources provided, encouraged to schedule dedicated visit with either her Ob/Gyn or with me if/when desires

## 2025-05-30 NOTE — ASSESSMENT & PLAN NOTE
Attributed to menorrhagia. Well controlled.   - continue iron replacement  - may be able to stop stool softener with initiation of metformin  - follow up 6 months